# Patient Record
Sex: MALE | Race: WHITE | NOT HISPANIC OR LATINO | Employment: OTHER | URBAN - METROPOLITAN AREA
[De-identification: names, ages, dates, MRNs, and addresses within clinical notes are randomized per-mention and may not be internally consistent; named-entity substitution may affect disease eponyms.]

---

## 2017-01-24 ENCOUNTER — RECORDS - HEALTHEAST (OUTPATIENT)
Dept: LAB | Facility: CLINIC | Age: 62
End: 2017-01-24

## 2017-01-24 LAB
CHOLEST SERPL-MCNC: 129 MG/DL
FASTING STATUS PATIENT QL REPORTED: ABNORMAL
HDLC SERPL-MCNC: 39 MG/DL
LDLC SERPL CALC-MCNC: 81 MG/DL
PSA SERPL-MCNC: 4.1 NG/ML (ref 0–4.5)
TRIGL SERPL-MCNC: 45 MG/DL

## 2017-01-25 LAB — HCV AB SERPL QL IA: NEGATIVE

## 2017-03-16 ENCOUNTER — OFFICE VISIT (OUTPATIENT)
Dept: NEUROLOGY | Facility: CLINIC | Age: 62
End: 2017-03-16

## 2017-03-16 DIAGNOSIS — G56.03 BILATERAL CARPAL TUNNEL SYNDROME: Primary | ICD-10-CM

## 2017-03-16 NOTE — MR AVS SNAPSHOT
After Visit Summary   3/16/2017    Amilcar Penaloza    MRN: [id number]           Patient Information     Date Of Birth          1955        Visit Information        Provider Department      3/16/2017 11:00 AM Marcellus Arellano MD [facility]        Today's Diagnoses     Bilateral carpal tunnel syndrome    -  1       Follow-ups after your visit        Who to contact     Please call your clinic at [phone] to:    Ask questions about your health    Make or cancel appointments    Discuss your medicines    Learn about your test results    Speak to your doctor   If you have compliments or concerns about an experience at your clinic, or if you wish to file a complaint, please contact [facility] Patient Relations at [phone] or email us at [email or web address]         Additional Information About Your Visit        MyChart Information     Lumicityt is an electronic gateway that provides easy, online access to your medical records. With BuildingLayer, you can request a clinic appointment, read your test results, renew a prescription or communicate with your care team.     To sign up for Lumicityt visit the website at [email or web address]   You will be asked to enter the access code listed below, as well as some personal information. Please follow the directions to create your username and password.     Your access code is: [email or web address]  Expires: 2017  1:12 PM     Your access code will  in 90 days. If you need help or a new code, please contact your [facility] or call [phone] for assistance.        Care EveryWhere ID     This is your Care EveryWhere ID. This could be used by other organizations to access your [facility] medical records  [id number]         Blood Pressure from Last 3 Encounters:   No data found for BP    Weight from Last 3 Encounters:   No data found  for Wt              We Performed the Following     NCS Motor w or w/o F-Wave, 5 or 6 (01834)     Needle EMG - 1 Extremity  (5 or more muscles w/ 3or more nerves or 4 or more spinal levels) (66023)        Primary Care Provider Office Phone # Fax #    Esa Maciel [phone]       [facility] [address] [place] [address]        Thank you!     Thank you for choosing [facility]  for your care. Our goal is always to provide you with excellent care. Hearing back from our patients is one way we can continue to improve our services. Please take a few minutes to complete the written survey that you may receive in the mail after your visit with us. Thank you!             Your Updated Medication List - Protect others around you: Learn how to safely use, store and throw away your medicines at www.disposemymeds.org.      Notice  As of 3/16/2017 11:59 PM    You have not been prescribed any medications.

## 2017-03-16 NOTE — LETTER
3/16/2017       RE: Milla Deluna  [address] [facility] [address]     Dear Colleague,    Thank you for referring your patient, Milla Deluna, to the [facility] NEUROLOGY CLINIC at [facility]. Please see a copy of my visit note below.    RE: MILLA DELUNA : 1955   MRN: [id number] YADIRA: 2017     REFERRING:   Esa Maciel MD, [place]  [address]     INDICATIONS:   Evaluate the upper extremities for pain, paresthesias and weakness.    RIGHT AND LEFT UPPER EXTREMITY EMG EXAMINATION    RIGHT UPPER EXTREMITY      CONDUCTION   VELOCITIES STIM. LATENCY  MS AMPLITUDE   DISTANCE  CM   NCV NORMAL   Right Median Motor Nerve Above Elbow 8.8  3.8 mV      Record  / APB      27 AE-W 54 M/sec >50    F-Wave :  33.4 msec Wrist 3.8  3.8 mV             Right Ulnar Motor Nerve Above Elbow 8.8  8.1 mV     Record / ADM      36 AE-W 59 M/sec >50    F-Wave:  31.3 msec Below Elbow    mV          - BE-W - M/sec >50    Wrist 2.7  8.1 mV                 Right Sensory Median Nerve  4.0  22 micro      Antidromic Record / Digit II                  Right Sensory Ulnar  3.1  25 micro     Antidromic Record / Digit V                  Left Median Sensory Nerve  3.8  22 micro     Antidromic Record / Digit II                           NEEDLE ELECTRODE EXAMINATION (EMG)   MUSCLES   POS. POT. FIBS. FASCIC.   MOTOR UNIT ACTION POTENTIALS     Right 1st Dorsal Interosseous 0 0 0 Normal   Right Pronator Teres 0 0 0 Normal   Right Abductor Pollicis Brevis 0 0 0 Mild to moderate increased duration, mild to moderate decreased number of units activated with increased polyphasic units seen   Right Biceps 0 0 0 Normal   Left Abductor Pollicis  Brevis 0 0 0 Mild increased duration, mild decreased number of units activated     CLINICAL SUMMARY:  Nerve conduction studies of the right and left upper extremities showed prolongation of both median sensory  nerve action potential latencies at the wrist.  There was borderline amplitude of the median compound muscle action potential and prolongation of the median F Wave.  Needle examination showed chronic neurogenic changes in both abductor pollicis brevis muscles, worse on the right than the left.     CLINICAL INTERPRETATION:  The electromyographic examination shows a moderate to moderately severe right carpal tunnel syndrome.  There is a moderate one on the left.       Marcellus Arellano M.D.  Department of Neurology   [facility]    cc:  Levy Palacios MD  [facility]  [address] [place]   [address]    Esa Maciel MD  [address]  [address]      D: 2017 12:12   T: 2017 16:48   MT: AKA    Name:     MILLA DELUNA   MRN:      [id number]        Account:      [id number]   :      1955           Service Date: 2017    Document: [id number]

## 2017-03-17 NOTE — PROGRESS NOTES
RE: MILLA DELUNA : 1955   MRN: [id number] YADIRA: 2017     REFERRING:   Esa Maciel MD, [place]  [address]     INDICATIONS:   Evaluate the upper extremities for pain, paresthesias and weakness.    RIGHT AND LEFT UPPER EXTREMITY EMG EXAMINATION    RIGHT UPPER EXTREMITY      CONDUCTION   VELOCITIES STIM. LATENCY  MS AMPLITUDE   DISTANCE  CM   NCV NORMAL   Right Median Motor Nerve Above Elbow 8.8  3.8 mV      Record  / APB      27 AE-W 54 M/sec >50    F-Wave :  33.4 msec Wrist 3.8  3.8 mV             Right Ulnar Motor Nerve Above Elbow 8.8  8.1 mV     Record / ADM      36 AE-W 59 M/sec >50    F-Wave:  31.3 msec Below Elbow    mV          - BE-W - M/sec >50    Wrist 2.7  8.1 mV                 Right Sensory Median Nerve  4.0  22 micro      Antidromic Record / Digit II                  Right Sensory Ulnar  3.1  25 micro     Antidromic Record / Digit V                  Left Median Sensory Nerve  3.8  22 micro     Antidromic Record / Digit II                           NEEDLE ELECTRODE EXAMINATION (EMG)   MUSCLES   POS. POT. FIBS. FASCIC.   MOTOR UNIT ACTION POTENTIALS     Right 1st Dorsal Interosseous 0 0 0 Normal   Right Pronator Teres 0 0 0 Normal   Right Abductor Pollicis Brevis 0 0 0 Mild to moderate increased duration, mild to moderate decreased number of units activated with increased polyphasic units seen   Right Biceps 0 0 0 Normal   Left Abductor Pollicis  Brevis 0 0 0 Mild increased duration, mild decreased number of units activated     CLINICAL SUMMARY:  Nerve conduction studies of the right and left upper extremities showed prolongation of both median sensory nerve action potential latencies at the wrist.  There was borderline amplitude of the median compound muscle action potential and prolongation of the median F Wave.  Needle examination showed chronic neurogenic changes in both abductor pollicis brevis muscles, worse on the right than the left.     CLINICAL  INTERPRETATION:  The electromyographic examination shows a moderate to moderately severe right carpal tunnel syndrome.  There is a moderate one on the left.       Marcellus Arellano M.D.  Department of Neurology   [facility]    cc:  Levy Palacios MD  [facility]  [address] [place]   [address]    Esa Maciel MD  [address]  [address]      D: 2017 12:12   T: 2017 16:48   MT: AKA    Name:     MILLA DELUNA   MRN:      [id number]        Account:      [id number]   :      1955           Service Date: 2017    Document: [id number]

## 2018-02-05 ENCOUNTER — AMBULATORY - HEALTHEAST (OUTPATIENT)
Dept: CARDIOLOGY | Facility: CLINIC | Age: 63
End: 2018-02-05

## 2018-02-05 ENCOUNTER — RECORDS - HEALTHEAST (OUTPATIENT)
Dept: LAB | Facility: CLINIC | Age: 63
End: 2018-02-05

## 2018-02-05 ENCOUNTER — OFFICE VISIT - HEALTHEAST (OUTPATIENT)
Dept: CARDIOLOGY | Facility: CLINIC | Age: 63
End: 2018-02-05

## 2018-02-05 ENCOUNTER — RECORDS - HEALTHEAST (OUTPATIENT)
Dept: ADMINISTRATIVE | Facility: OTHER | Age: 63
End: 2018-02-05

## 2018-02-05 DIAGNOSIS — R00.2 PALPITATION: ICD-10-CM

## 2018-02-05 DIAGNOSIS — I48.0 PAROXYSMAL ATRIAL FIBRILLATION (H): ICD-10-CM

## 2018-02-05 LAB
ALBUMIN SERPL-MCNC: 3.8 G/DL (ref 3.5–5)
ALP SERPL-CCNC: 82 U/L (ref 45–120)
ALT SERPL W P-5'-P-CCNC: 23 U/L (ref 0–45)
ANION GAP SERPL CALCULATED.3IONS-SCNC: 10 MMOL/L (ref 5–18)
AST SERPL W P-5'-P-CCNC: 23 U/L (ref 0–40)
ATRIAL RATE - MUSE: 71 BPM
BILIRUB SERPL-MCNC: 0.6 MG/DL (ref 0–1)
BUN SERPL-MCNC: 27 MG/DL (ref 8–22)
CALCIUM SERPL-MCNC: 9.6 MG/DL (ref 8.5–10.5)
CHLORIDE BLD-SCNC: 104 MMOL/L (ref 98–107)
CO2 SERPL-SCNC: 25 MMOL/L (ref 22–31)
CREAT SERPL-MCNC: 1.04 MG/DL (ref 0.7–1.3)
DIASTOLIC BLOOD PRESSURE - MUSE: NORMAL MMHG
GFR SERPL CREATININE-BSD FRML MDRD: >60 ML/MIN/1.73M2
GLUCOSE BLD-MCNC: 83 MG/DL (ref 70–125)
INTERPRETATION ECG - MUSE: NORMAL
P AXIS - MUSE: 66 DEGREES
POTASSIUM BLD-SCNC: 4.7 MMOL/L (ref 3.5–5)
PR INTERVAL - MUSE: 164 MS
PROT SERPL-MCNC: 7.1 G/DL (ref 6–8)
PSA SERPL-MCNC: 4.8 NG/ML (ref 0–4.5)
QRS DURATION - MUSE: 90 MS
QT - MUSE: 366 MS
QTC - MUSE: 397 MS
R AXIS - MUSE: 49 DEGREES
SODIUM SERPL-SCNC: 139 MMOL/L (ref 136–145)
SYSTOLIC BLOOD PRESSURE - MUSE: NORMAL MMHG
T AXIS - MUSE: 33 DEGREES
TSH SERPL DL<=0.005 MIU/L-ACNC: 2.18 UIU/ML (ref 0.3–5)
VENTRICULAR RATE- MUSE: 71 BPM

## 2018-02-05 RX ORDER — MERCAPTOPURINE 50 MG/1
50 TABLET ORAL DAILY
Status: SHIPPED | COMMUNITY
Start: 2018-02-05

## 2018-02-05 ASSESSMENT — MIFFLIN-ST. JEOR: SCORE: 1577.32

## 2018-02-09 ENCOUNTER — HOSPITAL ENCOUNTER (OUTPATIENT)
Dept: CARDIOLOGY | Facility: HOSPITAL | Age: 63
Discharge: HOME | End: 2018-02-09
Attending: INTERNAL MEDICINE

## 2018-02-09 ENCOUNTER — COMMUNICATION - HEALTHEAST (OUTPATIENT)
Dept: TELEHEALTH | Facility: CLINIC | Age: 63
End: 2018-02-09

## 2018-02-09 DIAGNOSIS — R00.2 PALPITATION: ICD-10-CM

## 2018-02-09 LAB
AORTIC ROOT: 3.8 CM
AORTIC VALVE MEAN VELOCITY: 73.7 CM/S
ASCENDING AORTA: 3.3 CM
AV CUSP SEPERATION: 2.3 CM
AV CUSP SEPERATION: 2.3 CM
AV DIMENSIONLESS INDEX VTI: 0.6
AV MEAN GRADIENT: 2 MMHG
AV PEAK GRADIENT: 4.4 MMHG
AV VALVE AREA: 2.6 CM2
BSA FOR ECHO PROCEDURE: 2 M2
CV ECHO HEIGHT: 67 IN
CV ECHO WEIGHT: 186 LBS
DOP CALC AO PEAK VEL: 105 CM/S
DOP CALC AO VTI: 19 CM
DOP CALC LVOT AREA: 4.15 CM2
DOP CALC LVOT DIAMETER: 2.3 CM
DOP CALC LVOT STROKE VOLUME: 49 CM3
DOP CALC MV VTI: 14.3 CM
DOP CALCLVOT PEAK VEL VTI: 11.8 CM
EJECTION FRACTION: 58 % (ref 55–75)
FRACTIONAL SHORTENING: 31.7 % (ref 28–44)
INTERVENTRICULAR SEPTUM IN END DIASTOLE: 1.3 CM (ref 0.6–1)
IVS/PW RATIO: 1
LA AREA 1: 14 CM2
LA AREA 2: 14.3 CM2
LEFT ATRIUM LENGTH: 4.72 CM
LEFT ATRIUM SIZE: 3.7 CM
LEFT ATRIUM TO AORTIC ROOT RATIO: 1 NO UNITS
LEFT ATRIUM VOLUME INDEX: 18 ML/M2
LEFT ATRIUM VOLUME: 36.1 ML
LEFT VENTRICLE CARDIAC INDEX: 1.8 L/MIN/M2
LEFT VENTRICLE CARDIAC OUTPUT: 3.7 L/MIN
LEFT VENTRICLE DIASTOLIC VOLUME INDEX: 18 CM3/M2 (ref 34–74)
LEFT VENTRICLE DIASTOLIC VOLUME: 36 CM3 (ref 62–150)
LEFT VENTRICLE HEART RATE: 75 BPM
LEFT VENTRICLE MASS INDEX: 96.7 G/M2
LEFT VENTRICLE SYSTOLIC VOLUME INDEX: 7.5 CM3/M2 (ref 11–31)
LEFT VENTRICLE SYSTOLIC VOLUME: 15 CM3 (ref 21–61)
LEFT VENTRICULAR INTERNAL DIMENSION IN DIASTOLE: 4.1 CM (ref 4.2–5.8)
LEFT VENTRICULAR INTERNAL DIMENSION IN SYSTOLE: 2.8 CM (ref 2.5–4)
LEFT VENTRICULAR MASS: 193.5 G
LEFT VENTRICULAR OUTFLOW TRACT MEAN GRADIENT: 1 MMHG
LEFT VENTRICULAR OUTFLOW TRACT MEAN VELOCITY: 47.9 CM/S
LEFT VENTRICULAR POSTERIOR WALL IN END DIASTOLE: 1.3 CM (ref 0.6–1)
LV STROKE VOLUME INDEX: 24.5 ML/M2
MITRAL VALVE DECELERATION SLOPE: 2530 MM/S2
MITRAL VALVE E/A RATIO: 0.7
MITRAL VALVE MEAN INFLOW VELOCITY: 34.9 CM/S
MITRAL VALVE PEAK VELOCITY: 78.6 CM/S
MITRAL VALVE PRESSURE HALF-TIME: 50 MS
MV AREA VTI: 3.43 CM2
MV AVERAGE E/E' RATIO: 6.8 CM/S
MV DECELERATION TIME: 197 MS
MV E'TISSUE VEL-LAT: 6.92 CM/S
MV E'TISSUE VEL-MED: 5.07 CM/S
MV LATERAL E/E' RATIO: 5.9
MV MEAN GRADIENT: 1 MMHG
MV MEDIAL E/E' RATIO: 8
MV PEAK A VELOCITY: 62.2 CM/S
MV PEAK E VELOCITY: 40.5 CM/S
MV PEAK GRADIENT: 2.5 MMHG
MV VALVE AREA BY CONTINUITY EQUATION: 3.4 CM2
MV VALVE AREA PRESSURE 1/2 METHOD: 4.4 CM2
NUC REST DIASTOLIC VOLUME INDEX: 2976 LBS
NUC REST SYSTOLIC VOLUME INDEX: 67 IN
TRICUSPID REGURGITATION PEAK PRESSURE GRADIENT: 22.1 MMHG
TRICUSPID VALVE ANULAR PLANE SYSTOLIC EXCURSION: 1.9 CM
TRICUSPID VALVE PEAK REGURGITANT VELOCITY: 235 CM/S

## 2018-02-09 ASSESSMENT — MIFFLIN-ST. JEOR: SCORE: 1577.32

## 2018-02-12 ENCOUNTER — COMMUNICATION - HEALTHEAST (OUTPATIENT)
Dept: CARDIOLOGY | Facility: CLINIC | Age: 63
End: 2018-02-12

## 2018-02-12 DIAGNOSIS — I48.0 PAROXYSMAL ATRIAL FIBRILLATION (H): ICD-10-CM

## 2018-02-13 ENCOUNTER — RECORDS - HEALTHEAST (OUTPATIENT)
Dept: LAB | Facility: CLINIC | Age: 63
End: 2018-02-13

## 2018-02-13 LAB
CHOLEST SERPL-MCNC: 136 MG/DL
FASTING STATUS PATIENT QL REPORTED: NORMAL
HDLC SERPL-MCNC: 40 MG/DL
LDLC SERPL CALC-MCNC: 86 MG/DL
TRIGL SERPL-MCNC: 52 MG/DL

## 2018-02-21 ENCOUNTER — COMMUNICATION - HEALTHEAST (OUTPATIENT)
Dept: CARDIOLOGY | Facility: CLINIC | Age: 63
End: 2018-02-21

## 2018-03-29 ENCOUNTER — OFFICE VISIT - HEALTHEAST (OUTPATIENT)
Dept: CARDIOLOGY | Facility: CLINIC | Age: 63
End: 2018-03-29

## 2018-03-29 DIAGNOSIS — I48.0 PAF (PAROXYSMAL ATRIAL FIBRILLATION) (H): ICD-10-CM

## 2018-03-29 DIAGNOSIS — I48.0 PAROXYSMAL ATRIAL FIBRILLATION (H): ICD-10-CM

## 2018-03-29 DIAGNOSIS — G47.33 OSA (OBSTRUCTIVE SLEEP APNEA): ICD-10-CM

## 2018-03-29 DIAGNOSIS — I48.92 ATRIAL FLUTTER, UNSPECIFIED TYPE (H): ICD-10-CM

## 2018-03-29 ASSESSMENT — MIFFLIN-ST. JEOR: SCORE: 1584.8

## 2018-06-19 ENCOUNTER — OFFICE VISIT - HEALTHEAST (OUTPATIENT)
Dept: CARDIOLOGY | Facility: CLINIC | Age: 63
End: 2018-06-19

## 2018-06-19 ENCOUNTER — RECORDS - HEALTHEAST (OUTPATIENT)
Dept: ADMINISTRATIVE | Facility: OTHER | Age: 63
End: 2018-06-19

## 2018-06-19 ENCOUNTER — AMBULATORY - HEALTHEAST (OUTPATIENT)
Dept: CARDIOLOGY | Facility: CLINIC | Age: 63
End: 2018-06-19

## 2018-06-19 DIAGNOSIS — I10 ESSENTIAL HYPERTENSION, BENIGN: ICD-10-CM

## 2018-06-19 DIAGNOSIS — I48.92 ATRIAL FLUTTER, UNSPECIFIED TYPE (H): ICD-10-CM

## 2018-06-19 DIAGNOSIS — G47.33 OSA (OBSTRUCTIVE SLEEP APNEA): ICD-10-CM

## 2018-06-19 DIAGNOSIS — I48.0 PAF (PAROXYSMAL ATRIAL FIBRILLATION) (H): ICD-10-CM

## 2018-06-19 ASSESSMENT — MIFFLIN-ST. JEOR: SCORE: 1557.47

## 2018-06-25 ENCOUNTER — COMMUNICATION - HEALTHEAST (OUTPATIENT)
Dept: CARDIOLOGY | Facility: CLINIC | Age: 63
End: 2018-06-25

## 2018-07-24 ENCOUNTER — OFFICE VISIT - HEALTHEAST (OUTPATIENT)
Dept: CARDIOLOGY | Facility: CLINIC | Age: 63
End: 2018-07-24

## 2018-07-24 DIAGNOSIS — I48.3 TYPICAL ATRIAL FLUTTER (H): ICD-10-CM

## 2018-07-24 DIAGNOSIS — I10 ESSENTIAL HYPERTENSION, BENIGN: ICD-10-CM

## 2018-07-24 DIAGNOSIS — G47.33 OSA (OBSTRUCTIVE SLEEP APNEA): ICD-10-CM

## 2018-07-24 DIAGNOSIS — I48.0 PAF (PAROXYSMAL ATRIAL FIBRILLATION) (H): ICD-10-CM

## 2018-07-24 ASSESSMENT — MIFFLIN-ST. JEOR: SCORE: 1543.86

## 2018-07-25 ENCOUNTER — COMMUNICATION - HEALTHEAST (OUTPATIENT)
Dept: CARDIOLOGY | Facility: CLINIC | Age: 63
End: 2018-07-25

## 2018-08-03 ENCOUNTER — COMMUNICATION - HEALTHEAST (OUTPATIENT)
Dept: CARDIOLOGY | Facility: CLINIC | Age: 63
End: 2018-08-03

## 2018-08-03 ENCOUNTER — HOSPITAL ENCOUNTER (OUTPATIENT)
Dept: CARDIOLOGY | Facility: CLINIC | Age: 63
Discharge: HOME | End: 2018-08-03
Attending: INTERNAL MEDICINE

## 2018-08-03 ENCOUNTER — AMBULATORY - HEALTHEAST (OUTPATIENT)
Dept: CARDIOLOGY | Facility: CLINIC | Age: 63
End: 2018-08-03

## 2018-08-03 DIAGNOSIS — I48.0 PAROXYSMAL ATRIAL FIBRILLATION (H): ICD-10-CM

## 2018-08-03 DIAGNOSIS — I48.3 TYPICAL ATRIAL FLUTTER (H): ICD-10-CM

## 2018-08-03 DIAGNOSIS — I10 ESSENTIAL HYPERTENSION, BENIGN: ICD-10-CM

## 2018-08-03 DIAGNOSIS — I48.0 PAF (PAROXYSMAL ATRIAL FIBRILLATION) (H): ICD-10-CM

## 2018-08-03 DIAGNOSIS — I10 BENIGN ESSENTIAL HYPERTENSION: ICD-10-CM

## 2018-08-03 ASSESSMENT — MIFFLIN-ST. JEOR: SCORE: 1543.86

## 2018-08-06 LAB
AORTIC ROOT: 3.1 CM
BSA FOR ECHO PROCEDURE: 1.95 M2
CV BLOOD PRESSURE: NORMAL MMHG
CV ECHO HEIGHT: 67.8 IN
CV ECHO WEIGHT: 176 LBS
DOP CALC LVOT AREA: 4.15 CM2
DOP CALC LVOT DIAMETER: 2.3 CM
DOP CALC LVOT PEAK VEL: 82.7 CM/S
DOP CALC LVOT STROKE VOLUME: 73.9 CM3
DOP CALCLVOT PEAK VEL VTI: 17.8 CM
EJECTION FRACTION: 61 % (ref 55–75)
FRACTIONAL SHORTENING: 46.8 % (ref 28–44)
INTERVENTRICULAR SEPTUM IN END DIASTOLE: 1.1 CM (ref 0.6–1)
IVS/PW RATIO: 1
LA AREA 1: 15 CM2
LA AREA 2: 17.5 CM2
LEFT ATRIUM LENGTH: 5.1 CM
LEFT ATRIUM SIZE: 3.4 CM
LEFT ATRIUM TO AORTIC ROOT RATIO: 1.1 NO UNITS
LEFT ATRIUM VOLUME INDEX: 22.4 ML/M2
LEFT ATRIUM VOLUME: 43.8 ML
LEFT VENTRICLE CARDIAC INDEX: 2.7 L/MIN/M2
LEFT VENTRICLE CARDIAC OUTPUT: 5.2 L/MIN
LEFT VENTRICLE DIASTOLIC VOLUME INDEX: 33.8 CM3/M2 (ref 34–74)
LEFT VENTRICLE DIASTOLIC VOLUME: 66 CM3 (ref 62–150)
LEFT VENTRICLE HEART RATE: 70 BPM
LEFT VENTRICLE MASS INDEX: 96.2 G/M2
LEFT VENTRICLE SYSTOLIC VOLUME INDEX: 13.3 CM3/M2 (ref 11–31)
LEFT VENTRICLE SYSTOLIC VOLUME: 26 CM3 (ref 21–61)
LEFT VENTRICULAR INTERNAL DIMENSION IN DIASTOLE: 4.7 CM (ref 4.2–5.8)
LEFT VENTRICULAR INTERNAL DIMENSION IN SYSTOLE: 2.5 CM (ref 2.5–4)
LEFT VENTRICULAR MASS: 187.5 G
LEFT VENTRICULAR OUTFLOW TRACT MEAN GRADIENT: 1 MMHG
LEFT VENTRICULAR OUTFLOW TRACT MEAN VELOCITY: 51.2 CM/S
LEFT VENTRICULAR OUTFLOW TRACT PEAK GRADIENT: 3 MMHG
LEFT VENTRICULAR POSTERIOR WALL IN END DIASTOLE: 1.1 CM (ref 0.6–1)
LV STROKE VOLUME INDEX: 37.9 ML/M2
MITRAL VALVE E/A RATIO: 0.9
MV AVERAGE E/E' RATIO: 6.4 CM/S
MV DECELERATION TIME: 222 MS
MV E'TISSUE VEL-LAT: 9.07 CM/S
MV E'TISSUE VEL-MED: 6.43 CM/S
MV LATERAL E/E' RATIO: 5.4
MV MEDIAL E/E' RATIO: 7.7
MV PEAK A VELOCITY: 52.3 CM/S
MV PEAK E VELOCITY: 49.4 CM/S
NUC REST DIASTOLIC VOLUME INDEX: 2816 LBS
NUC REST SYSTOLIC VOLUME INDEX: 67.75 IN
RIGHT VENTRICULAR INTERNAL DIMENSION IN DYSTOLE: 3.1 CM
TRICUSPID REGURGITATION PEAK PRESSURE GRADIENT: 22.8 MMHG
TRICUSPID VALVE ANULAR PLANE SYSTOLIC EXCURSION: 2.5 CM
TRICUSPID VALVE PEAK REGURGITANT VELOCITY: 239 CM/S

## 2018-09-10 ENCOUNTER — RECORDS - HEALTHEAST (OUTPATIENT)
Dept: ADMINISTRATIVE | Facility: OTHER | Age: 63
End: 2018-09-10

## 2018-09-17 ENCOUNTER — OFFICE VISIT - HEALTHEAST (OUTPATIENT)
Dept: CARDIOLOGY | Facility: CLINIC | Age: 63
End: 2018-09-17

## 2018-09-17 DIAGNOSIS — G47.33 OSA (OBSTRUCTIVE SLEEP APNEA): ICD-10-CM

## 2018-09-17 DIAGNOSIS — I48.0 PAF (PAROXYSMAL ATRIAL FIBRILLATION) (H): ICD-10-CM

## 2018-09-17 DIAGNOSIS — I10 ESSENTIAL HYPERTENSION, BENIGN: ICD-10-CM

## 2018-09-17 DIAGNOSIS — I48.3 TYPICAL ATRIAL FLUTTER (H): ICD-10-CM

## 2018-09-17 ASSESSMENT — MIFFLIN-ST. JEOR: SCORE: 1557.47

## 2018-09-20 ENCOUNTER — AMBULATORY - HEALTHEAST (OUTPATIENT)
Dept: CARDIOLOGY | Facility: CLINIC | Age: 63
End: 2018-09-20

## 2018-09-20 ENCOUNTER — SURGERY - HEALTHEAST (OUTPATIENT)
Dept: CARDIOLOGY | Facility: CLINIC | Age: 63
End: 2018-09-20

## 2018-09-20 DIAGNOSIS — Z01.818 PRE-OP TESTING: ICD-10-CM

## 2018-09-20 DIAGNOSIS — I48.3 TYPICAL ATRIAL FLUTTER (H): ICD-10-CM

## 2018-09-20 DIAGNOSIS — I48.91 ATRIAL FIBRILLATION (H): ICD-10-CM

## 2018-10-25 ENCOUNTER — SURGERY - HEALTHEAST (OUTPATIENT)
Dept: CARDIOLOGY | Facility: CLINIC | Age: 63
End: 2018-10-25

## 2018-10-25 DIAGNOSIS — I48.0 PAROXYSMAL ATRIAL FIBRILLATION (H): ICD-10-CM

## 2018-10-30 ENCOUNTER — COMMUNICATION - HEALTHEAST (OUTPATIENT)
Dept: CARDIOLOGY | Facility: CLINIC | Age: 63
End: 2018-10-30

## 2018-10-30 DIAGNOSIS — I48.0 PAROXYSMAL ATRIAL FIBRILLATION (H): ICD-10-CM

## 2018-10-31 ENCOUNTER — RECORDS - HEALTHEAST (OUTPATIENT)
Dept: ADMINISTRATIVE | Facility: OTHER | Age: 63
End: 2018-10-31

## 2018-11-05 ENCOUNTER — COMMUNICATION - HEALTHEAST (OUTPATIENT)
Dept: CARDIOLOGY | Facility: CLINIC | Age: 63
End: 2018-11-05

## 2018-11-05 DIAGNOSIS — I10 BENIGN ESSENTIAL HYPERTENSION: ICD-10-CM

## 2018-11-05 DIAGNOSIS — I48.0 PAROXYSMAL ATRIAL FIBRILLATION (H): ICD-10-CM

## 2018-11-29 ENCOUNTER — RECORDS - HEALTHEAST (OUTPATIENT)
Dept: ADMINISTRATIVE | Facility: OTHER | Age: 63
End: 2018-11-29

## 2018-12-04 ENCOUNTER — AMBULATORY - HEALTHEAST (OUTPATIENT)
Dept: CARDIOLOGY | Facility: CLINIC | Age: 63
End: 2018-12-04

## 2018-12-04 ENCOUNTER — OFFICE VISIT - HEALTHEAST (OUTPATIENT)
Dept: CARDIOLOGY | Facility: CLINIC | Age: 63
End: 2018-12-04

## 2018-12-04 DIAGNOSIS — I10 ESSENTIAL HYPERTENSION, BENIGN: ICD-10-CM

## 2018-12-04 DIAGNOSIS — I48.3 TYPICAL ATRIAL FLUTTER (H): ICD-10-CM

## 2018-12-04 DIAGNOSIS — I48.0 PAF (PAROXYSMAL ATRIAL FIBRILLATION) (H): ICD-10-CM

## 2018-12-04 DIAGNOSIS — G47.33 OSA (OBSTRUCTIVE SLEEP APNEA): ICD-10-CM

## 2018-12-04 ASSESSMENT — MIFFLIN-ST. JEOR: SCORE: 1575.61

## 2018-12-20 ENCOUNTER — HOSPITAL ENCOUNTER (OUTPATIENT)
Dept: CT IMAGING | Facility: CLINIC | Age: 63
Discharge: HOME | End: 2018-12-20
Attending: INTERNAL MEDICINE

## 2018-12-20 DIAGNOSIS — I48.0 PAF (PAROXYSMAL ATRIAL FIBRILLATION) (H): ICD-10-CM

## 2018-12-20 LAB
BSA FOR ECHO PROCEDURE: 1.99 M2
CREAT BLD-MCNC: 1.3 MG/DL
POC GFR AMER AF HE - HISTORICAL: >60 ML/MIN/1.73M2
POC GFR NON AMER AF HE - HISTORICAL: 55 ML/MIN/1.73M2

## 2018-12-20 ASSESSMENT — MIFFLIN-ST. JEOR: SCORE: 1575.61

## 2019-01-07 ENCOUNTER — AMBULATORY - HEALTHEAST (OUTPATIENT)
Dept: CARDIOLOGY | Facility: CLINIC | Age: 64
End: 2019-01-07

## 2019-01-17 ENCOUNTER — OFFICE VISIT - HEALTHEAST (OUTPATIENT)
Dept: CARDIOLOGY | Facility: CLINIC | Age: 64
End: 2019-01-17

## 2019-01-17 ENCOUNTER — SURGERY - HEALTHEAST (OUTPATIENT)
Dept: CARDIOLOGY | Facility: CLINIC | Age: 64
End: 2019-01-17

## 2019-01-17 ENCOUNTER — AMBULATORY - HEALTHEAST (OUTPATIENT)
Dept: CARDIOLOGY | Facility: CLINIC | Age: 64
End: 2019-01-17

## 2019-01-17 DIAGNOSIS — I10 ESSENTIAL HYPERTENSION, BENIGN: ICD-10-CM

## 2019-01-17 DIAGNOSIS — I48.0 PAROXYSMAL ATRIAL FIBRILLATION (H): ICD-10-CM

## 2019-01-17 DIAGNOSIS — G47.33 OSA (OBSTRUCTIVE SLEEP APNEA): ICD-10-CM

## 2019-01-17 DIAGNOSIS — I48.3 TYPICAL ATRIAL FLUTTER (H): ICD-10-CM

## 2019-01-17 DIAGNOSIS — I48.0 PAF (PAROXYSMAL ATRIAL FIBRILLATION) (H): ICD-10-CM

## 2019-01-17 LAB
ANION GAP SERPL CALCULATED.3IONS-SCNC: 5 MMOL/L (ref 5–18)
ATRIAL RATE - MUSE: 68 BPM
BUN SERPL-MCNC: 21 MG/DL (ref 8–22)
CALCIUM SERPL-MCNC: 9.7 MG/DL (ref 8.5–10.5)
CHLORIDE BLD-SCNC: 102 MMOL/L (ref 98–107)
CO2 SERPL-SCNC: 30 MMOL/L (ref 22–31)
CREAT SERPL-MCNC: 1.13 MG/DL (ref 0.7–1.3)
DIASTOLIC BLOOD PRESSURE - MUSE: NORMAL MMHG
ERYTHROCYTE [DISTWIDTH] IN BLOOD BY AUTOMATED COUNT: 12.5 % (ref 11–14.5)
GFR SERPL CREATININE-BSD FRML MDRD: >60 ML/MIN/1.73M2
GLUCOSE BLD-MCNC: 89 MG/DL (ref 70–125)
HCT VFR BLD AUTO: 47 % (ref 40–54)
HGB BLD-MCNC: 16 G/DL (ref 14–18)
INTERPRETATION ECG - MUSE: NORMAL
MCH RBC QN AUTO: 31.9 PG (ref 27–34)
MCHC RBC AUTO-ENTMCNC: 34 G/DL (ref 32–36)
MCV RBC AUTO: 94 FL (ref 80–100)
P AXIS - MUSE: 57 DEGREES
PLATELET # BLD AUTO: 205 THOU/UL (ref 140–440)
PMV BLD AUTO: 9.6 FL (ref 8.5–12.5)
POTASSIUM BLD-SCNC: 4.5 MMOL/L (ref 3.5–5)
PR INTERVAL - MUSE: 166 MS
QRS DURATION - MUSE: 92 MS
QT - MUSE: 384 MS
QTC - MUSE: 408 MS
R AXIS - MUSE: 36 DEGREES
RBC # BLD AUTO: 5.01 MILL/UL (ref 4.4–6.2)
SODIUM SERPL-SCNC: 137 MMOL/L (ref 136–145)
SYSTOLIC BLOOD PRESSURE - MUSE: NORMAL MMHG
T AXIS - MUSE: 29 DEGREES
VENTRICULAR RATE- MUSE: 68 BPM
WBC: 6.8 THOU/UL (ref 4–11)

## 2019-01-17 ASSESSMENT — MIFFLIN-ST. JEOR: SCORE: 1586.5

## 2019-01-22 ENCOUNTER — ANESTHESIA - HEALTHEAST (OUTPATIENT)
Dept: CARDIOLOGY | Facility: CLINIC | Age: 64
End: 2019-01-22

## 2019-01-23 ENCOUNTER — SURGERY - HEALTHEAST (OUTPATIENT)
Dept: CARDIOLOGY | Facility: CLINIC | Age: 64
End: 2019-01-23

## 2019-01-23 ASSESSMENT — MIFFLIN-ST. JEOR
SCORE: 1590.93
SCORE: 1603.63

## 2019-01-24 ASSESSMENT — MIFFLIN-ST. JEOR: SCORE: 1564.17

## 2019-01-28 ENCOUNTER — AMBULATORY - HEALTHEAST (OUTPATIENT)
Dept: CARDIOLOGY | Facility: CLINIC | Age: 64
End: 2019-01-28

## 2019-01-28 DIAGNOSIS — I48.0 PAROXYSMAL ATRIAL FIBRILLATION (H): ICD-10-CM

## 2019-01-28 ASSESSMENT — MIFFLIN-ST. JEOR: SCORE: 1571.42

## 2019-01-30 ENCOUNTER — RECORDS - HEALTHEAST (OUTPATIENT)
Dept: CARDIOLOGY | Facility: CLINIC | Age: 64
End: 2019-01-30

## 2019-03-06 ENCOUNTER — OFFICE VISIT - HEALTHEAST (OUTPATIENT)
Dept: CARDIOLOGY | Facility: CLINIC | Age: 64
End: 2019-03-06

## 2019-03-06 DIAGNOSIS — G47.33 OSA (OBSTRUCTIVE SLEEP APNEA): ICD-10-CM

## 2019-03-06 DIAGNOSIS — I10 ESSENTIAL HYPERTENSION, BENIGN: ICD-10-CM

## 2019-03-06 DIAGNOSIS — I48.3 TYPICAL ATRIAL FLUTTER (H): ICD-10-CM

## 2019-03-06 DIAGNOSIS — I48.0 PAROXYSMAL ATRIAL FIBRILLATION (H): ICD-10-CM

## 2019-03-06 ASSESSMENT — MIFFLIN-ST. JEOR: SCORE: 1572.78

## 2019-03-07 ENCOUNTER — HOSPITAL ENCOUNTER (OUTPATIENT)
Dept: CARDIOLOGY | Facility: HOSPITAL | Age: 64
Discharge: HOME | End: 2019-03-07
Attending: NURSE PRACTITIONER

## 2019-03-07 DIAGNOSIS — I48.3 TYPICAL ATRIAL FLUTTER (H): ICD-10-CM

## 2019-03-07 DIAGNOSIS — I48.0 PAROXYSMAL ATRIAL FIBRILLATION (H): ICD-10-CM

## 2019-04-10 ENCOUNTER — OFFICE VISIT - HEALTHEAST (OUTPATIENT)
Dept: CARDIOLOGY | Facility: CLINIC | Age: 64
End: 2019-04-10

## 2019-04-10 DIAGNOSIS — I10 ESSENTIAL HYPERTENSION, BENIGN: ICD-10-CM

## 2019-04-10 DIAGNOSIS — Z98.890 STATUS POST CATHETER ABLATION OF ATRIAL FIBRILLATION: ICD-10-CM

## 2019-04-10 DIAGNOSIS — I48.0 PAROXYSMAL ATRIAL FIBRILLATION (H): ICD-10-CM

## 2019-04-10 DIAGNOSIS — I10 BENIGN ESSENTIAL HYPERTENSION: ICD-10-CM

## 2019-04-10 DIAGNOSIS — G47.33 OSA (OBSTRUCTIVE SLEEP APNEA): ICD-10-CM

## 2019-04-10 DIAGNOSIS — I48.3 TYPICAL ATRIAL FLUTTER (H): ICD-10-CM

## 2019-04-10 ASSESSMENT — MIFFLIN-ST. JEOR: SCORE: 1554.64

## 2019-05-02 ENCOUNTER — COMMUNICATION - HEALTHEAST (OUTPATIENT)
Dept: CARDIOLOGY | Facility: CLINIC | Age: 64
End: 2019-05-02

## 2019-05-02 DIAGNOSIS — I48.91 ATRIAL FIBRILLATION (H): ICD-10-CM

## 2019-06-06 ENCOUNTER — RECORDS - HEALTHEAST (OUTPATIENT)
Dept: LAB | Facility: CLINIC | Age: 64
End: 2019-06-06

## 2019-06-06 LAB
ALBUMIN SERPL-MCNC: 3.9 G/DL (ref 3.5–5)
ALP SERPL-CCNC: 98 U/L (ref 45–120)
ALT SERPL W P-5'-P-CCNC: 24 U/L (ref 0–45)
ANION GAP SERPL CALCULATED.3IONS-SCNC: 6 MMOL/L (ref 5–18)
AST SERPL W P-5'-P-CCNC: 28 U/L (ref 0–40)
BASOPHILS # BLD AUTO: 0 THOU/UL (ref 0–0.2)
BASOPHILS NFR BLD AUTO: 1 % (ref 0–2)
BILIRUB SERPL-MCNC: 0.7 MG/DL (ref 0–1)
BUN SERPL-MCNC: 26 MG/DL (ref 8–22)
CALCIUM SERPL-MCNC: 9.4 MG/DL (ref 8.5–10.5)
CHLORIDE BLD-SCNC: 103 MMOL/L (ref 98–107)
CHOLEST SERPL-MCNC: 125 MG/DL
CO2 SERPL-SCNC: 29 MMOL/L (ref 22–31)
CREAT SERPL-MCNC: 1 MG/DL (ref 0.7–1.3)
EOSINOPHIL # BLD AUTO: 0.1 THOU/UL (ref 0–0.4)
EOSINOPHIL NFR BLD AUTO: 1 % (ref 0–6)
ERYTHROCYTE [DISTWIDTH] IN BLOOD BY AUTOMATED COUNT: 13.4 % (ref 11–14.5)
FASTING STATUS PATIENT QL REPORTED: NO
GFR SERPL CREATININE-BSD FRML MDRD: >60 ML/MIN/1.73M2
GLUCOSE BLD-MCNC: 87 MG/DL (ref 70–125)
HCT VFR BLD AUTO: 42.2 % (ref 40–54)
HDLC SERPL-MCNC: 39 MG/DL
HGB BLD-MCNC: 14.1 G/DL (ref 14–18)
LDLC SERPL CALC-MCNC: 79 MG/DL
LYMPHOCYTES # BLD AUTO: 1 THOU/UL (ref 0.8–4.4)
LYMPHOCYTES NFR BLD AUTO: 16 % (ref 20–40)
MCH RBC QN AUTO: 31.7 PG (ref 27–34)
MCHC RBC AUTO-ENTMCNC: 33.4 G/DL (ref 32–36)
MCV RBC AUTO: 95 FL (ref 80–100)
MONOCYTES # BLD AUTO: 0.7 THOU/UL (ref 0–0.9)
MONOCYTES NFR BLD AUTO: 11 % (ref 2–10)
NEUTROPHILS # BLD AUTO: 4.7 THOU/UL (ref 2–7.7)
NEUTROPHILS NFR BLD AUTO: 72 % (ref 50–70)
PLATELET # BLD AUTO: 246 THOU/UL (ref 140–440)
PMV BLD AUTO: 9.6 FL (ref 8.5–12.5)
POTASSIUM BLD-SCNC: 4.3 MMOL/L (ref 3.5–5)
PROT SERPL-MCNC: 6.9 G/DL (ref 6–8)
RBC # BLD AUTO: 4.45 MILL/UL (ref 4.4–6.2)
SODIUM SERPL-SCNC: 138 MMOL/L (ref 136–145)
TRIGL SERPL-MCNC: 34 MG/DL
WBC: 6.6 THOU/UL (ref 4–11)

## 2019-06-10 ENCOUNTER — AMBULATORY - HEALTHEAST (OUTPATIENT)
Dept: CARDIOLOGY | Facility: CLINIC | Age: 64
End: 2019-06-10

## 2019-07-26 ENCOUNTER — COMMUNICATION - HEALTHEAST (OUTPATIENT)
Dept: CARDIOLOGY | Facility: CLINIC | Age: 64
End: 2019-07-26

## 2019-07-26 DIAGNOSIS — I48.0 PAROXYSMAL ATRIAL FIBRILLATION (H): ICD-10-CM

## 2019-08-02 ENCOUNTER — COMMUNICATION - HEALTHEAST (OUTPATIENT)
Dept: CARDIOLOGY | Facility: CLINIC | Age: 64
End: 2019-08-02

## 2019-08-02 DIAGNOSIS — I10 BENIGN ESSENTIAL HYPERTENSION: ICD-10-CM

## 2019-09-18 ENCOUNTER — RECORDS - HEALTHEAST (OUTPATIENT)
Dept: ADMINISTRATIVE | Facility: OTHER | Age: 64
End: 2019-09-18

## 2019-09-24 ENCOUNTER — OFFICE VISIT - HEALTHEAST (OUTPATIENT)
Dept: CARDIOLOGY | Facility: CLINIC | Age: 64
End: 2019-09-24

## 2019-09-24 DIAGNOSIS — I48.0 PAROXYSMAL ATRIAL FIBRILLATION (H): ICD-10-CM

## 2019-09-24 DIAGNOSIS — I10 ESSENTIAL HYPERTENSION, BENIGN: ICD-10-CM

## 2019-09-24 DIAGNOSIS — G47.33 OSA (OBSTRUCTIVE SLEEP APNEA): ICD-10-CM

## 2019-09-24 DIAGNOSIS — I48.3 TYPICAL ATRIAL FLUTTER (H): ICD-10-CM

## 2019-09-24 ASSESSMENT — MIFFLIN-ST. JEOR: SCORE: 1572.78

## 2019-10-03 ENCOUNTER — COMMUNICATION - HEALTHEAST (OUTPATIENT)
Dept: CARDIOLOGY | Facility: CLINIC | Age: 64
End: 2019-10-03

## 2019-10-07 ENCOUNTER — AMBULATORY - HEALTHEAST (OUTPATIENT)
Dept: CARDIOLOGY | Facility: CLINIC | Age: 64
End: 2019-10-07

## 2019-10-07 DIAGNOSIS — I48.20 CHRONIC ATRIAL FIBRILLATION (H): ICD-10-CM

## 2019-10-07 LAB
ATRIAL RATE - MUSE: 83 BPM
DIASTOLIC BLOOD PRESSURE - MUSE: NORMAL
INTERPRETATION ECG - MUSE: NORMAL
P AXIS - MUSE: 69 DEGREES
PR INTERVAL - MUSE: 164 MS
QRS DURATION - MUSE: 86 MS
QT - MUSE: 348 MS
QTC - MUSE: 408 MS
R AXIS - MUSE: 50 DEGREES
SYSTOLIC BLOOD PRESSURE - MUSE: NORMAL
T AXIS - MUSE: 40 DEGREES
VENTRICULAR RATE- MUSE: 83 BPM

## 2019-10-07 ASSESSMENT — MIFFLIN-ST. JEOR: SCORE: 1582.44

## 2019-10-10 ENCOUNTER — AMBULATORY - HEALTHEAST (OUTPATIENT)
Dept: CARDIOLOGY | Facility: CLINIC | Age: 64
End: 2019-10-10

## 2020-03-26 ENCOUNTER — RECORDS - HEALTHEAST (OUTPATIENT)
Dept: ADMINISTRATIVE | Facility: OTHER | Age: 65
End: 2020-03-26

## 2020-03-31 ENCOUNTER — OFFICE VISIT - HEALTHEAST (OUTPATIENT)
Dept: CARDIOLOGY | Facility: CLINIC | Age: 65
End: 2020-03-31

## 2020-03-31 DIAGNOSIS — Z98.890 STATUS POST CATHETER ABLATION OF ATRIAL FIBRILLATION: ICD-10-CM

## 2020-03-31 DIAGNOSIS — I48.0 PAROXYSMAL ATRIAL FIBRILLATION (H): ICD-10-CM

## 2020-03-31 DIAGNOSIS — I48.3 TYPICAL ATRIAL FLUTTER (H): ICD-10-CM

## 2020-03-31 DIAGNOSIS — I10 ESSENTIAL HYPERTENSION, BENIGN: ICD-10-CM

## 2020-04-22 ENCOUNTER — AMBULATORY - HEALTHEAST (OUTPATIENT)
Dept: CARDIOLOGY | Facility: CLINIC | Age: 65
End: 2020-04-22

## 2020-04-22 DIAGNOSIS — I48.0 PAROXYSMAL ATRIAL FIBRILLATION (H): ICD-10-CM

## 2020-05-01 ENCOUNTER — COMMUNICATION - HEALTHEAST (OUTPATIENT)
Dept: CARDIOLOGY | Facility: CLINIC | Age: 65
End: 2020-05-01

## 2020-05-01 DIAGNOSIS — I10 BENIGN ESSENTIAL HYPERTENSION: ICD-10-CM

## 2020-07-31 ENCOUNTER — COMMUNICATION - HEALTHEAST (OUTPATIENT)
Dept: CARDIOLOGY | Facility: CLINIC | Age: 65
End: 2020-07-31

## 2020-08-18 ENCOUNTER — RECORDS - HEALTHEAST (OUTPATIENT)
Dept: LAB | Facility: CLINIC | Age: 65
End: 2020-08-18

## 2020-08-18 LAB
ALBUMIN SERPL-MCNC: 3.7 G/DL (ref 3.5–5)
ALP SERPL-CCNC: 105 U/L (ref 45–120)
ALT SERPL W P-5'-P-CCNC: 23 U/L (ref 0–45)
ANION GAP SERPL CALCULATED.3IONS-SCNC: 9 MMOL/L (ref 5–18)
AST SERPL W P-5'-P-CCNC: 21 U/L (ref 0–40)
BASOPHILS # BLD AUTO: 0 THOU/UL (ref 0–0.2)
BASOPHILS NFR BLD AUTO: 1 % (ref 0–2)
BILIRUB SERPL-MCNC: 0.5 MG/DL (ref 0–1)
BUN SERPL-MCNC: 21 MG/DL (ref 8–22)
CALCIUM SERPL-MCNC: 8.9 MG/DL (ref 8.5–10.5)
CHLORIDE BLD-SCNC: 104 MMOL/L (ref 98–107)
CHOLEST SERPL-MCNC: 138 MG/DL
CO2 SERPL-SCNC: 26 MMOL/L (ref 22–31)
CREAT SERPL-MCNC: 1.09 MG/DL (ref 0.7–1.3)
EOSINOPHIL # BLD AUTO: 0 THOU/UL (ref 0–0.4)
EOSINOPHIL NFR BLD AUTO: 1 % (ref 0–6)
ERYTHROCYTE [DISTWIDTH] IN BLOOD BY AUTOMATED COUNT: 12.7 % (ref 11–14.5)
FASTING STATUS PATIENT QL REPORTED: ABNORMAL
GFR SERPL CREATININE-BSD FRML MDRD: >60 ML/MIN/1.73M2
GLUCOSE BLD-MCNC: 130 MG/DL (ref 70–125)
HCT VFR BLD AUTO: 41.5 % (ref 40–54)
HDLC SERPL-MCNC: 37 MG/DL
HGB BLD-MCNC: 14.3 G/DL (ref 14–18)
IMM GRANULOCYTES # BLD: 0 THOU/UL
IMM GRANULOCYTES NFR BLD: 1 %
LDLC SERPL CALC-MCNC: 70 MG/DL
LYMPHOCYTES # BLD AUTO: 1.1 THOU/UL (ref 0.8–4.4)
LYMPHOCYTES NFR BLD AUTO: 18 % (ref 20–40)
MCH RBC QN AUTO: 32.2 PG (ref 27–34)
MCHC RBC AUTO-ENTMCNC: 34.5 G/DL (ref 32–36)
MCV RBC AUTO: 94 FL (ref 80–100)
MONOCYTES # BLD AUTO: 0.6 THOU/UL (ref 0–0.9)
MONOCYTES NFR BLD AUTO: 9 % (ref 2–10)
NEUTROPHILS # BLD AUTO: 4.5 THOU/UL (ref 2–7.7)
NEUTROPHILS NFR BLD AUTO: 72 % (ref 50–70)
PLATELET # BLD AUTO: 225 THOU/UL (ref 140–440)
PMV BLD AUTO: 9.9 FL (ref 8.5–12.5)
POTASSIUM BLD-SCNC: 4.3 MMOL/L (ref 3.5–5)
PROT SERPL-MCNC: 7.2 G/DL (ref 6–8)
RBC # BLD AUTO: 4.44 MILL/UL (ref 4.4–6.2)
SODIUM SERPL-SCNC: 139 MMOL/L (ref 136–145)
TRIGL SERPL-MCNC: 155 MG/DL
WBC: 6.2 THOU/UL (ref 4–11)

## 2020-08-19 LAB — 25(OH)D3 SERPL-MCNC: 49.4 NG/ML (ref 30–80)

## 2020-08-28 ENCOUNTER — COMMUNICATION - HEALTHEAST (OUTPATIENT)
Dept: CARDIOLOGY | Facility: CLINIC | Age: 65
End: 2020-08-28

## 2020-12-29 ENCOUNTER — COMMUNICATION - HEALTHEAST (OUTPATIENT)
Dept: CARDIOLOGY | Facility: CLINIC | Age: 65
End: 2020-12-29

## 2021-01-11 ENCOUNTER — COMMUNICATION - HEALTHEAST (OUTPATIENT)
Dept: CARDIOLOGY | Facility: CLINIC | Age: 66
End: 2021-01-11

## 2021-01-11 DIAGNOSIS — I48.0 PAROXYSMAL ATRIAL FIBRILLATION (H): ICD-10-CM

## 2021-01-15 ENCOUNTER — COMMUNICATION - HEALTHEAST (OUTPATIENT)
Dept: CARDIOLOGY | Facility: CLINIC | Age: 66
End: 2021-01-15

## 2021-01-15 DIAGNOSIS — I10 BENIGN ESSENTIAL HYPERTENSION: ICD-10-CM

## 2021-01-20 ENCOUNTER — AMBULATORY - HEALTHEAST (OUTPATIENT)
Dept: CARDIOLOGY | Facility: CLINIC | Age: 66
End: 2021-01-20

## 2021-01-20 DIAGNOSIS — I48.0 PAROXYSMAL ATRIAL FIBRILLATION (H): ICD-10-CM

## 2021-01-28 ENCOUNTER — RECORDS - HEALTHEAST (OUTPATIENT)
Dept: ADMINISTRATIVE | Facility: OTHER | Age: 66
End: 2021-01-28

## 2021-01-28 ENCOUNTER — AMBULATORY - HEALTHEAST (OUTPATIENT)
Dept: CARDIOLOGY | Facility: CLINIC | Age: 66
End: 2021-01-28

## 2021-02-03 ENCOUNTER — COMMUNICATION - HEALTHEAST (OUTPATIENT)
Dept: CARDIOLOGY | Facility: CLINIC | Age: 66
End: 2021-02-03

## 2021-02-04 ENCOUNTER — OFFICE VISIT - HEALTHEAST (OUTPATIENT)
Dept: CARDIOLOGY | Facility: CLINIC | Age: 66
End: 2021-02-04

## 2021-02-04 DIAGNOSIS — G47.33 OSA (OBSTRUCTIVE SLEEP APNEA): ICD-10-CM

## 2021-02-04 DIAGNOSIS — I10 ESSENTIAL HYPERTENSION, BENIGN: ICD-10-CM

## 2021-02-04 DIAGNOSIS — I48.0 PAROXYSMAL ATRIAL FIBRILLATION (H): ICD-10-CM

## 2021-02-04 DIAGNOSIS — I48.3 TYPICAL ATRIAL FLUTTER (H): ICD-10-CM

## 2021-02-04 ASSESSMENT — MIFFLIN-ST. JEOR: SCORE: 1563.94

## 2021-03-09 ENCOUNTER — AMBULATORY - HEALTHEAST (OUTPATIENT)
Dept: NURSING | Facility: CLINIC | Age: 66
End: 2021-03-09

## 2021-03-30 ENCOUNTER — AMBULATORY - HEALTHEAST (OUTPATIENT)
Dept: NURSING | Facility: CLINIC | Age: 66
End: 2021-03-30

## 2021-04-11 ENCOUNTER — COMMUNICATION - HEALTHEAST (OUTPATIENT)
Dept: CARDIOLOGY | Facility: CLINIC | Age: 66
End: 2021-04-11

## 2021-04-11 DIAGNOSIS — I10 ESSENTIAL (PRIMARY) HYPERTENSION: ICD-10-CM

## 2021-04-12 RX ORDER — LOSARTAN POTASSIUM 25 MG/1
TABLET ORAL
Qty: 90 TABLET | Refills: 2 | Status: SHIPPED | OUTPATIENT
Start: 2021-04-12 | End: 2021-12-02

## 2021-05-17 ENCOUNTER — DOCUMENTATION ONLY (OUTPATIENT)
Dept: SLEEP MEDICINE | Facility: CLINIC | Age: 66
End: 2021-05-17

## 2021-05-18 NOTE — PROGRESS NOTES
[facility] DREAMWEAR PILLOW M[facility]/HEADGEAR, HIS SLEEP PROVIDER TOLD HIM TO TRY PILLOW MASK.  HE ALSO RECD FILTERS FOR DREAMSTATION CPAP.

## 2021-05-27 NOTE — PROGRESS NOTES
[facility] HEART [facility]  Arrhythmia Clinic  Royer Springer    Referring:      Assessment:         Paroxysmal atrial fibrillation: The patient is 3 months out from his ablation procedure to treat his atrial fibrillation.  He is done well with no recurrent symptoms of palpitations and no ECG documented recurrence of atrial fibrillation or flutter.  Patient is off membrane active antiarrhythmic drug therapy.  The patient remains on oral anticoagulant therapy given his elevated [id number] score.  Long-term the patient is interested in being off his oral anticoagulant therapy and we briefly touched on left atrial appendage occlusion.    Essential hypertension: The patient's blood pressure is borderline today and I am decreasing his beta-blocker dose.  I will institute low-dose ARB therapy today.    Elevated PSA: The patient apparently had an elevated PSA drawn shortly after his ablation procedure.  They are recommending a prostate biopsy.  The patient's Eliquis can be stopped 2 days prior to the biopsy (this is adequate time to completely normalize his coagulation status).  He should resume the Eliquis as soon as possible post biopsy.      Recommendations:    Beta-blocker dose will be cut to Toprol-XL 25 mg daily    Initiate losartan 25 mg daily.    Pre-prostate biopsy:The patient's Eliquis can be stopped 2 days prior to the biopsy (this is adequate time to completely normalize his coagulation status).    I will asked  to review the patient's CT angiogram to see if there is any evidence of coronary calcium.    Follow-up in the A. fib clinic with the EP nurse practitioner in 3 months.      Patient Active Problem List   Diagnosis     Crohn's colitis (H)     Paroxysmal atrial fibrillation (H)     Typical atrial flutter (H)     MAKAYLA (obstructive sleep apnea)     Essential hypertension, benign     Status post catheter ablation of atrial fibrillation       Subjective:  Amilcar MELISSA ChaudharyTremaine (63 y.o. male) returns to  the arrhythmia clinic for interval reevaluation of his atrial fibrillation post ablation.  The patient is 3 months out from his ablation procedure to treat his atrial for ablation.  His recovery was uneventful.  The patient's has had no recurrent palpitations or other symptoms to suggest return of atrial fibrillation or flutter.  The patient reports no exertional chest pain or pressure.  He is not having any orthopnea PND or ankle edema.    Current Outpatient Medications   Medication Sig Dispense Refill     apixaban (ELIQUIS) 5 mg Tab tablet Take 1 tablet (5 mg total) by mouth 2 (two) times a day. Start 12/23/18 1 month prior to ablation 180 tablet 2     ascorbic acid, vitamin C, (ASCORBIC ACID WITH BERNABE HIPS) 500 MG tablet Take 500 mg by mouth daily.       calcium carbonate (OS-MAYLIN) 600 mg calcium (1,500 mg) tablet Take 600 mg by mouth daily.       cholecalciferol, vitamin D3, 1,000 unit tablet Take 1,000 Units by mouth daily.       mercaptopurine (PURINETHOL) 50 mg tablet Take 50 mg by mouth daily.       metoprolol succinate (TOPROL XL) 50 MG 24 hr tablet Take 0.5 tablets (25 mg total) by mouth daily. 90 tablet 1     multivitamin therapeutic tablet Take 1 tablet by mouth daily.       losartan (COZAAR) 25 MG tablet Take 1 tablet (25 mg total) by mouth daily. 30 tablet 3     No current facility-administered medications for this visit.        Review of Systems:   General: WNL  Eyes: WNL  Ears/Nose/Throat: WNL  Lungs: WNL  Heart: WNL  Stomach: WNL  Bladder: WNL  Muscle/Joints: WNL  Skin: WNL  Nervous System: WNL  Mental Health: WNL     Blood: WNL    Family History  Family History   Problem Relation Age of Onset     Pacemaker Mother      CABG Mother 90     No Medical Problems Father      No Medical Problems Sister      No Medical Problems Brother      No Medical Problems Sister      Sudden death Sister 58     Other Sister 16        Motorcycle accident     No Medical Problems Sister      No Medical Problems Brother       "No Medical Problems Brother      No Medical Problems Brother        Social History   reports that he has never smoked. He has never used smokeless tobacco.    Objective:   Vital signs in last 24 hours:  /80 (Patient Site: Left Arm, Patient Position: Sitting, Cuff Size: Adult Regular)   Pulse 91   Resp 20   Ht 5' 7\" (1.702 m)   Wt 181 lb (82.1 kg)   BMI 28.35 kg/m    Weight: Weight: 181 lb (82.1 kg)     Physical Exam:  General: The patient is alert oriented to person place and situation.  The patient is in no acute distress at the time of my evaluation.  Eyes: Pupils are equal, round, and reactive to light.  Conjunctiva and sclera are clear.  ENT: Oral mucosa is moist and without redness. No evident nasal discharge.  Pulmonary: Lungs are clear bilaterally with no rales, rhonchi, or wheezes.    Cardiovascular exam: Rhythm is regular. S1 and S2 are normal. No significant murmur is present. JVP is normal. Lower extremities demonstrate no significant edema. Distal pulses are intact bilaterally.  Abdomen is flat, soft, and nontender.  Musculoskeletal: Spine is straight. Extremities without deformity.  Neuro: Gait is normal.   Skin is warm, dry, and otherwise intact.      Cardiographics:   Patient activated monitor dated March 7, 2019  Indications     Paroxysmal atrial fibrillation (H)   Typical atrial flutter (H)   Conclusion        HOLTER MONITOR REPORT     INTERPRETATION DATE:  03/21/2019:       TEST DATE:  03/07/2019 through 03/21/2019     INTERPRETATION:  Amilcar Penaloza had a patch type patient activated ECG monitor  between 03/07/2019 and 03/21/2019 for paroxysmal atrial fibrillation.  He was quite  compliant with wearing the patch during this time.  Baseline transmission on 03/07  showed normal sinus rhythm, rate 80 to 90 beats per minute, with occasional atrial  premature beats.  No other strips were transmitted during the monitoring period.   ACT heart rates generally ranged between 60 and 100 beats per " minute.  No  symptomatic episodes were reported.  No atrial fibrillation was detected and there  were no pauses greater than 3 seconds.         Lab Results:   Lab Results   Component Value Date     01/17/2019    K 4.5 01/17/2019     01/17/2019    CO2 30 01/17/2019    BUN 21 01/17/2019    CREATININE 1.13 01/17/2019    CALCIUM 9.7 01/17/2019     Lab Results   Component Value Date    WBC 6.8 01/17/2019    HGB 16.0 01/17/2019    HCT 47.0 01/17/2019    MCV 94 01/17/2019     01/17/2019     No results found for: INR      Outside record review:

## 2021-05-28 ENCOUNTER — RECORDS - HEALTHEAST (OUTPATIENT)
Dept: ADMINISTRATIVE | Facility: CLINIC | Age: 66
End: 2021-05-28

## 2021-05-30 ENCOUNTER — HEALTH MAINTENANCE LETTER (OUTPATIENT)
Age: 66
End: 2021-05-30

## 2021-06-01 VITALS — WEIGHT: 179 LBS | BODY MASS INDEX: 27.13 KG/M2 | HEIGHT: 68 IN

## 2021-06-01 VITALS — BODY MASS INDEX: 26.67 KG/M2 | HEIGHT: 68 IN | WEIGHT: 176 LBS

## 2021-06-01 VITALS — WEIGHT: 176 LBS | HEIGHT: 68 IN | BODY MASS INDEX: 26.67 KG/M2

## 2021-06-01 VITALS — WEIGHT: 182.4 LBS | BODY MASS INDEX: 27.02 KG/M2 | HEIGHT: 69 IN

## 2021-06-01 VITALS — BODY MASS INDEX: 29.19 KG/M2 | HEIGHT: 67 IN | WEIGHT: 186 LBS

## 2021-06-01 VITALS — HEIGHT: 67 IN | BODY MASS INDEX: 29.19 KG/M2 | WEIGHT: 186 LBS

## 2021-06-01 NOTE — PATIENT INSTRUCTIONS - HE
Amilcar Penaloza,    It was a pleasure to see you today at the [facility].     My recommendations after this visit include:    Stop metoprolol.    Please call if symptoms of reoccurrence of atrial fibrillation.      To followup with Dr. Springer or myself in 6 months.      My contact information:  Felix Gross CNP  After Hours or Scheduling  [phone]  My Nurse---Jess Nam [phone]

## 2021-06-02 ENCOUNTER — RECORDS - HEALTHEAST (OUTPATIENT)
Dept: ADMINISTRATIVE | Facility: CLINIC | Age: 66
End: 2021-06-02

## 2021-06-02 VITALS — HEIGHT: 67 IN | WEIGHT: 183.1 LBS | BODY MASS INDEX: 28.74 KG/M2

## 2021-06-02 VITALS — HEIGHT: 68 IN | WEIGHT: 183 LBS | BODY MASS INDEX: 27.74 KG/M2

## 2021-06-02 VITALS — BODY MASS INDEX: 28.1 KG/M2 | WEIGHT: 185.4 LBS | HEIGHT: 68 IN

## 2021-06-02 VITALS — BODY MASS INDEX: 29.03 KG/M2 | HEIGHT: 67 IN | WEIGHT: 185 LBS

## 2021-06-02 VITALS — BODY MASS INDEX: 27.74 KG/M2 | WEIGHT: 183 LBS | HEIGHT: 68 IN

## 2021-06-02 VITALS — BODY MASS INDEX: 28.99 KG/M2 | HEIGHT: 67 IN | WEIGHT: 184.7 LBS

## 2021-06-02 VITALS — BODY MASS INDEX: 28.41 KG/M2 | HEIGHT: 67 IN | WEIGHT: 181 LBS

## 2021-06-02 VITALS — WEIGHT: 179 LBS | HEIGHT: 68 IN | BODY MASS INDEX: 27.13 KG/M2

## 2021-06-02 NOTE — TELEPHONE ENCOUNTER
Zestar Study Consent Visit    Study description: ECG and PPG Study: Zestar Study      Amilcar Penaloza a 63 y.o. male , was contacted by today to discuss participation in the Zestar study.     The patient called the Clinical Trials Office to inquire about study participation.  The consent form was reviewed with the patient.     The review of the study included:    Study purpose     Conflict of interest    Device description      Study visits    Risks of participation    Benefits (if any)    Alternatives    Voluntary participation    Confidentiality     Compensation/costs of participation    Study stipends    Injury and legal rights    The subject was queried in regards to his willingness to continue and come in for scheduled appointment. his questions were answered to his satisfaction.   The patient has given his preliminary agreement to volunteer to participate in the above noted study.     Plan: Amilcar Penaloza will come to [facility] on 10/07/2019 to continue consent process. If he continues to agrees to participate, the study visit will be done on the same day.    Leatha Hankins RN

## 2021-06-03 VITALS
HEART RATE: 92 BPM | WEIGHT: 186 LBS | BODY MASS INDEX: 29.19 KG/M2 | DIASTOLIC BLOOD PRESSURE: 78 MMHG | RESPIRATION RATE: 12 BRPM | HEIGHT: 67 IN | TEMPERATURE: 97.9 F | SYSTOLIC BLOOD PRESSURE: 121 MMHG

## 2021-06-03 VITALS
BODY MASS INDEX: 29.03 KG/M2 | DIASTOLIC BLOOD PRESSURE: 80 MMHG | HEIGHT: 67 IN | RESPIRATION RATE: 16 BRPM | WEIGHT: 185 LBS | SYSTOLIC BLOOD PRESSURE: 118 MMHG | HEART RATE: 81 BPM

## 2021-06-05 VITALS
HEIGHT: 67 IN | SYSTOLIC BLOOD PRESSURE: 106 MMHG | HEART RATE: 73 BPM | BODY MASS INDEX: 28.56 KG/M2 | DIASTOLIC BLOOD PRESSURE: 66 MMHG | WEIGHT: 182 LBS | OXYGEN SATURATION: 94 % | RESPIRATION RATE: 16 BRPM

## 2021-06-10 NOTE — TELEPHONE ENCOUNTER
[facility] was called, corresponding information/recommendations reviewed and contact information was given for further concerns/questions   8/10/2020 1:20 PM  Inge Bazan RN

## 2021-06-10 NOTE — TELEPHONE ENCOUNTER
----- Message from Reva Calzada sent at 8/28/2020 11:55 AM CDT -----      Caller: Patient  Primary cardiologist: Dr. Springer    Detailed reason for call: Patient stated that his urologist wanted to do a biopsy procedure and the patient stated that he was told that he should call to see about hold and bridge orders on his apixaban ANTICOAGULANT (ELIQUIS) 5 mg Tab tablet. Please advise    Best phone number: [phone]  Best time to contact: today  Ok to leave a detailed message? yes  Device? no

## 2021-06-10 NOTE — TELEPHONE ENCOUNTER
Reviewed in Felix's absence.  Ok to hold Eliquis for 3 days prior to procedure, no bridging recommended.  Restart ASAP afterwards.   Thanks,   Yissel

## 2021-06-10 NOTE — TELEPHONE ENCOUNTER
Hold Eliquis 2 days prior to procedure, no bridging recommended.  Resume Eliquis ASAP after procedure.  Thanks,  Yissel

## 2021-06-10 NOTE — TELEPHONE ENCOUNTER
Felix,     Patient of yours and Dr. Berrios most recent visit 3/31/20, where he reported no symptoms to suggest recurrent Afib/afutter and is s/p PVI 1/23/19 with a [id number] score of 2.    Can you help with hold orders for upcoming colonscopy on 8/25, they are requesting a 3 day hold.    Thank you  Radha

## 2021-06-10 NOTE — TELEPHONE ENCOUNTER
Yissel-  Can you review in Dr Springer's absence  Pt needing to have a biopsy   Needing hold orders for Eliquis  Hx of PVI in Jan 2019, and no documented reoccurrence of AF since that time  Recently saw Dr Springer in March of this year  YSQ4GS5-OPMs score = 2 (age, HTN)    Ok to hold 3 days prior without bridging?  Please advise  Thank you,  Adamaris

## 2021-06-10 NOTE — TELEPHONE ENCOUNTER
----- Message from Sindy Zamudio sent at 7/31/2020  1:01 PM CDT -----  General phone call: [facility]  Caller: Nurse  Primary cardiologist: Gian  Detailed reason for call: Need holding and bridging for Eloquis- proced on 8/25- requesting 3 day protocol  New or active symptoms?   Best phone number: [phone]- ext: [phone]  Best time to contact:   Ok to leave a detailed message?   Device?    Additional Info:

## 2021-06-11 NOTE — TELEPHONE ENCOUNTER
Pt was called, corresponding information/recommendations reviewed, verbalized understanding, has no further questions at this time and contact information was given for further concerns/questions   These recommendations and guidelines were also faxed to [facility] Dr Lee who is going to be performing the procedure   8/28/2020 1:10 PM  Inge Brandon RN

## 2021-06-14 NOTE — TELEPHONE ENCOUNTER
pc to patient.  Reviewed pharmacy recommendations with patient.  No further questions or concerns.  JW

## 2021-06-14 NOTE — TELEPHONE ENCOUNTER
Pharmacy,     Do you see any interactions with patients med and requested supplement?    Thanks for your help,   Radha BRANTLEY

## 2021-06-14 NOTE — TELEPHONE ENCOUNTER
----- Message from Jeimy Jhaveri V sent at 12/29/2020 11:25 AM CST -----  General phone call: Amilcar has pain in his hand/joints.  He wants to take glucosamine chondroitin, but he wanted to check with Dr. Springer to see if it would be safe to take it with his other medications.      Caller: Amilcar  Primary cardiologist: Emmett  Detailed reason for call: Medication interactions  New or active symptoms? No  Best phone number: [phone]  Best time to contact: Anytime  Ok to leave a detailed message? Yes  Device? No    Additional Info:

## 2021-06-15 NOTE — PATIENT INSTRUCTIONS - HE
Amilcar Penaloza,    It was a pleasure to see you today at the [facility].     My recommendations after this visit include:    Please call symptoms of recurrence of atrial fibrillation.      To followup with Dr. Springer in 1 year.      My contact information:  Felix Gross CNP  After Hours or Scheduling  [phone]  My Nurse---Jess Nam [phone]

## 2021-06-15 NOTE — PROGRESS NOTES
[facility] Note    Assessment:    Amilcar Penaloza is a 62 y.o. old male with MAKAYLA and Crohn disease here for w/u of AF.    Plan:  # AF -  New diagnosis, a paroxysm in PCP office, now back in NSR, unclear onset, CHADSVasc 0 based on what we know, but will check a TTE to evaluate for any e/o LVEF decrease, Holter to evaluate for AF burden  - even though technically he has no need for AC, would favor continuing ASA   - encouraged CPAP compliance  - could start low dose toprol 25 mg prior to knee surgery this upcoming Fri, TTE as per above, otherwise no obvious reason to further postpone it  - continue to monitor and re-assess CHADSVasc scores overt time    F/u in 1 year    PABLO MARTIN  [facility] HEART CARE  [phone]  ______________________________________________________________________    Subjective:  CC: AF    I had the opportunity to see Amilcar Penaloza at the [facility]. Amilcar Penaloza is a 62 y.o. male with a known history of MAKAYLA and Crohn disease here for w/u of AF.    He has generally done well, and has been asymptomatic but at the time of his PCP visit for evaluation prior to a scheduled knee surgery earlier today, when it was discovered on a routine EKG that he has AF.   His history otherwise included - stress test in '06 and w/u that he thinks was essentially unrevealing for occasional dizziness. 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 Not particularly active due to hamstring injury now, so gained ~ 15 lbs in the past year. No CP, SOB, LOPEZ, orthopnea/PND/edema/syncope +  Occasional lightheadedness when he bends over and stands up ~ once a week or less. No edema, abdominal distention, N/V/F/C. Stopped wearing CPAP as he snores less. EKG in PCP office earlier today w/ AF at 112, EKG in our office NSR at  "71.  ______________________________________________________________________    Review of Systems:   As noted in HPI, all others reviewed and are negative    Problem List:  There is no problem list on file for this patient.    Medical History:  No past medical history on file.  Surgical History:  No past surgical history on file.  Social History:  Social History     Social History     Marital status:      Spouse name: N/A     Number of children: N/A     Years of education: N/A     Occupational History     Not on file.     Social History Main Topics     Smoking status: Not on file     Smokeless tobacco: Not on file     Alcohol use Not on file     Drug use: Not on file     Sexual activity: Not on file     Other Topics Concern     Not on file     Social History Narrative     Family History:  No family history on file.    Allergies:  Allergies not on file    Medications:  Current Outpatient Prescriptions   Medication Sig Dispense Refill     mercaptopurine (PURINETHOL) 50 mg tablet Take 50 mg by mouth daily.       No current facility-administered medications for this visit.      Objective:   Vital signs:  /88 (Patient Site: Right Arm, Patient Position: Sitting, Cuff Size: Adult Large)  Pulse 76  Resp 16  Ht 5' 7\" (1.702 m)  Wt 186 lb (84.4 kg)  BMI 29.13 kg/m2  Physical Exam:  GENERAL APPEARANCE: Alert, cooperative and in no acute distress.   HEENT: No scleral icterus. Oral mucuos membranes pink and moist.   NECK: JVP 8. No Hepatojugular reflux. Thyroid not visualized. No lymphadenopathy   CHEST: clear to auscultation   CARDIOVASCULAR: S1, S2 without murmur ,clicks or rubs. Brachial, radial and posterior tibial pulses are intact and symetric. No carotid bruits noted. No edema  ABDOMEN: Nontender. BS+. No bruits.   SKIN: No Xanthelasma   Musculoskeletal: No cyanosis, clubbing or swelling.    Lab Results:  LIPIDS:  Lab Results   Component Value Date    CHOL 129 01/24/2017    CHOL 137 01/19/2016    CHOL 133 " 09/22/2014     Lab Results   Component Value Date    HDL 39 (L) 01/24/2017    HDL 45 01/19/2016    HDL 42 09/22/2014     Lab Results   Component Value Date    LDLCALC 81 01/24/2017    LDLCALC 85 01/19/2016    LDLCALC 83 09/22/2014     Lab Results   Component Value Date    TRIG 45 01/24/2017    TRIG 36 01/19/2016    TRIG 41 09/22/2014     No components found for: CHOLHDL    BMP:  Lab Results   Component Value Date    CREATININE 1.06 01/24/2017    BUN 20 01/24/2017     01/24/2017    K 4.1 01/24/2017     01/24/2017    CO2 24 01/24/2017     [facility]

## 2021-06-16 PROBLEM — I48.3 TYPICAL ATRIAL FLUTTER (H): Status: ACTIVE | Noted: 2018-03-29

## 2021-06-16 PROBLEM — K50.10 CROHN'S COLITIS (H): Status: ACTIVE | Noted: 2018-03-29

## 2021-06-16 PROBLEM — I10 ESSENTIAL HYPERTENSION, BENIGN: Status: ACTIVE | Noted: 2018-06-19

## 2021-06-16 PROBLEM — I48.0 PAROXYSMAL ATRIAL FIBRILLATION (H): Status: ACTIVE | Noted: 2018-03-29

## 2021-06-16 PROBLEM — Z98.890 STATUS POST CATHETER ABLATION OF ATRIAL FIBRILLATION: Status: ACTIVE | Noted: 2019-01-23

## 2021-06-16 PROBLEM — G47.33 OSA (OBSTRUCTIVE SLEEP APNEA): Status: ACTIVE | Noted: 2018-03-29

## 2021-06-17 NOTE — TELEPHONE ENCOUNTER
Telephone Encounter by Inge Brandon RN at 8/28/2020  3:46 PM     Author: Inge Brandon RN Service: -- Author Type: Registered Nurse    Filed: 8/28/2020  3:46 PM Encounter Date: 8/28/2020 Status: Signed    : Inge Brandon RN (Registered Nurse)       Noted.  This was faxed again.  Nayeli Escamilla [facility] Ep Rn Support Pool    Caller: Unspecified (Today,  3:07 PM)               General phone call:     Caller: Amilcar Edmondson     Primary cardiologist: MARIFER     Detailed reason for call: Pt states that the faxed hold and bridge orders for his Eloquis medication was not received by [facility]. Pt is asking for the orders to be resent to fax number [phone]. Please call pt for any questions.     Best phone number: [phone]     Best time to contact: Anytime     Ok to leave a detailed message? Yes     Device? No     Additional Info:

## 2021-06-17 NOTE — PROGRESS NOTES
[facility] HEART [facility]   Arrhythmia Clinic    Assessment/Plan:  Diagnoses and all orders for this visit:    PAF (paroxysmal atrial fibrillation) and Atrial flutter, unspecified type with RVR demonstrated on 24-hour Holter.  Average heart rate in the 24 hours was 100.  Atrial flutter with RVR of 160s-170s on average.  He was cutting wood at the time.  He was symptomatic with fatigue, short of breath and lightheaded.  He was unaware of any other symptoms during  the 24 hour monitor.  There is no diary associated with his Holter but we reviewed holter in detail today.  He had frequent episodes of short A. fib and 2 episodes of atrial flutter.  He recalls last fall when he was hunting elk that he had a day of hunting where he was extremely fatigued and somewhat lightheaded.  He had never had that before.  Now looking back he is thought of some times where he is felt somewhat like that.  Unclear to me if the symptoms are related only to heart rate or the rhythm itself.  I discussed natural progression with atrial fib and flutter and how they are related.  I discussed progression varies quite widely from one individual to another but he appears like he will be more aggressive in progression.  I discussed the association between MAKAYLA untreated and atrial fib.  Hyperthyroidism was ruled out as TSH normal.  No valvular heart disease on echo.  Therefore cause for atrial fib and flutter is likely MAKAYLA untreated.  I discussed rate versus rhythm control decided on symptoms.  I discussed antiarrhythmics and ablation if he symptomatic with atrial arrhythmias.  Due to frequent episodes and fast heart rates with Holter I recommended to further increase metoprolol from 25 to 50 mg orally every day.  To follow-up with me in 3 months.  Will have him follow with EP versus Dr. Muniz.  He can move up appointment with me if he wants to switch from rate to rhythm control.  He has been doing a lot of Internet reading and a lot of  questions were answered.  I gave him some reputable websites to look up more information after the visit.  [id number] score of 0 but will be one within the year.  Discussed low risk for stroke and for now okay to stay on aspirin, but will need anticoagulation at some point.  See HPI for details.  -     metoprolol succinate (TOPROL-XL) 50 MG 24 hr tablet; Take 1 tablet (50 mg total) by mouth daily.  Dispense: 30 tablet; Refill: 6    MAKAYLA (obstructive sleep apnea) and off of CPAP and has gained about 20 pounds since then.  He was having daytime sleepiness with work and is no longer having daytime sleepiness despite being off his CPAP.  Discussed that we will push him hard to have MAKAYLA treated if he wants to follow rhythm control and will be less firm if he is going to follow rate control.    Other orders  -     aspirin 325 MG EC tablet; Take 325 mg by mouth daily.    60 minutes were spent in face-to-face counseling regarding above diagnoses and options for treatment.  ______________________________________________________________________    Subjective:    I had the opportunity to see Amilcar Penaloza at the [facility]. Amilcar Penaloza is a 62 y.o. male and here for EP follow-up secondary to new diagnosis of paroxysmal atrial fibrillation and paroxysmal atrial flutter .  Amilcar Penaloza had a preop physical on February 5, 2018 and diagnosed with atrial fibrillation and then saw cardiologist here in clinic later that same day.  His knee surgery was canceled.  It has been done now and had arthroscopy of the right knee with partial meniscus removal.  He has done well post surgery.  He is not back to all of normal physical activities yet as some knee pain.  He is a retired .  He was instructed to stay on aspirin 325 mg 1 tab orally every day for 1 month and then decrease to 81 mg.  I agree with this.  He has a history of Crohn's colitis which was diagnosed in his 50s.  He has been on baby  aspirin for years.  Due to GI history would continue baby aspirin when able to decrease.  He reports snoring at night and is trying to teach himself to sleep on his side.  He reports that he was also snoring with CPAP on.  He is seeing Dr. Tracy in sleep clinic later on today.  He lives in [place] but comes to the [facility] for medical care.  ______________________________________________________________________    Problem List:  Patient Active Problem List   Diagnosis     Crohn's colitis     PAF (paroxysmal atrial fibrillation)     Atrial flutter     MAKAYLA (obstructive sleep apnea)     Medical History:  No past medical history on file.  Surgical History:  No past surgical history on file.  Social History:  Social History   Substance Use Topics     Smoking status: Never Smoker     Smokeless tobacco: Never Used     Alcohol use Not on file        Review of Systems: Review of Systems:   General: WNL  Eyes: WNL  Ears/Nose/Throat: WNL  Lungs: WNL  Heart: WNL  Stomach: WNL  Bladder: WNL  Muscle/Joints: WNL  Skin: WNL  Nervous System: WNL  Mental Health: WNL     Blood: WNL      Family History:  No family history on file.      Allergies:  Allergies   Allergen Reactions     Sulfa (Sulfonamide Antibiotics) Swelling     Medications:  Current Outpatient Prescriptions   Medication Sig Dispense Refill     ascorbic acid, vitamin C, (ASCORBIC ACID WITH BERNABE HIPS) 500 MG tablet Take 500 mg by mouth daily.       aspirin 325 MG EC tablet Take 325 mg by mouth daily.       calcium carbonate (OS-MAYLIN) 600 mg calcium (1,500 mg) tablet Take 600 mg by mouth daily.       cholecalciferol, vitamin D3, 1,000 unit tablet Take 1,000 Units by mouth daily.       mercaptopurine (PURINETHOL) 50 mg tablet Take 50 mg by mouth daily.       metoprolol succinate (TOPROL-XL) 50 MG 24 hr tablet Take 1 tablet (50 mg total) by mouth daily. 30 tablet 6     multivitamin therapeutic tablet Take 1 tablet by mouth daily.       oxybutynin (DITROPAN XL) 10 MG ER  "tablet 10 mg daily.   1     No current facility-administered medications for this visit.        Objective:   Vital signs:  /82 (Patient Site: Left Arm, Patient Position: Sitting, Cuff Size: Adult Regular)  Pulse 80  Resp 16  Ht 5' 8.5\" (1.74 m) Comment: shoes on  Wt 182 lb 6.4 oz (82.7 kg) Comment: shoes on  BMI 27.33 kg/m2      Physical Exam:    GENERAL APPEARANCE: Alert, cooperative and in no acute distress.  HEENT: No scleral icterus. No Xanthelasma. Oral mucuos membranes pink and moist.  NECK: JVP Nl.   CHEST: clear to auscultation  CARDIOVASCULAR: S1, S2 without murmur ,clicks or rubs. Regular, regular.  Radial and posterior tibial pulses are intact and symetric.   EXTREMITIES: No cyanosis, clubbing or edema.    Results personally reviewed:  Results for orders placed during the hospital encounter of 02/09/18   Echo Complete [ECH10] 02/09/2018    Narrative   No previous study for comparison.    Patient in atrial fibrillation with rapid ventricular response during   the early portions of the examination and appeared to convert to normal   sinus rhythm during the mid portion of the examination.    Normal left ventricular size. Left ventricular systolic function appears   lower limits of normal. Left ventricular ejection fraction estimated   50-55%.    Normal right ventricular size and systolic function.    No significant valve abnormality observed    Cannot exclude small PFO        February 2018 24-hour Holter shows:  INTERPRETATION:  Predominant rhythm is sinus rhythm but patient has frequent  episodes of paroxysmal atrial fibrillation and also atrial flutter with rapid  ventricular response.  Average sinus rates ranged from approximately 54 beats per  minute up to approximately 120 beats per minute.  Patient has occasional atrial  premature beats and short runs of primary atrial tachycardia.  Paroxysmal atrial  fibrillation is relatively frequent with most runs 30 beats seconds to approximately  a " minute in duration with average ventricular response during these times at  approximately 130 to 140 beats per minute.  At times atrial fibrillation organizes  into atrial flutter which was associated with lightheadedness from 3:35 p.m. to  approximately 4:30 p.m.  During these times average ventricular response is  approximately 160 beats per minute.  There were no clinically significant pauses.   Some aberrancy is noted with atrial premature beats.  Occasional ventricular  premature beats were present, 609 over 24 hours.  Symptoms of lightheadedness were  associated with atrial flutter and rapid ventricular response averaging  approximately 160 beats per minute in the early afternoon.  CONCLUSION:  Frequent episodes of paroxysmal atrial fibrillation of short duration  and atrial flutter with rapid ventricular response over for longer periods of time.   Average heart rate was approximately 160 beats per minute between approximately 2  and 4:15 p.m. associated with atrial flutter.    Results for orders placed or performed in visit on 02/05/18   ECG Clinic - Today   Result Value Ref Range    SYSTOLIC BLOOD PRESSURE  mmHg    DIASTOLIC BLOOD PRESSURE  mmHg    VENTRICULAR RATE 71 BPM    ATRIAL RATE 71 BPM    P-R INTERVAL 164 ms    QRS DURATION 90 ms    Q-T INTERVAL 366 ms    QTC CALCULATION (BEZET) 397 ms    P Axis 66 degrees    R AXIS 49 degrees    T AXIS 33 degrees    MUSE DIAGNOSIS       Normal sinus rhythm  Normal ECG  No previous ECGs available  Confirmed by CHRISTOPHER FARRAR MD LOC:[facility] ([id number]) on 2/5/2018 3:33:24 PM         TSH:   Lab Results   Component Value Date    TSH 2.18 02/05/2018     BNP: No results found for: BNP  BMP:  Lab Results   Component Value Date    CREATININE 1.04 02/05/2018    BUN 27 (H) 02/05/2018     02/05/2018    K 4.7 02/05/2018     02/05/2018    CO2 25 02/05/2018       This note has been dictated using voice recognition software. Any grammatical or context distortions are  unintentional and inherent to the software.    WOJCIECH FERRIS RN, [facility] HEART [facility]  [phone]

## 2021-06-20 NOTE — PROGRESS NOTES
1955  Home:[phone] (home) Cell:[phone] (mobile)  Emergency Contact: Sadaf Penaloza [phone]    +++Important patient information for CSC/Cath Lab staff : None+++    [facility] EP Cath Lab Procedure Order   Ablation Type:  PVI- Atrial Fibrillation/Flutter  Specific location of pathway: Left     Diagnosis:  AF  Anticipated Case Duration:  4-Two PVIs in a day   Scheduling Needs/Timeframe:  Schedule on date after Dr Springer apt on 12/5, will need to start anticoagualtion 4 wks prior to procedure    MD Preference: Dr Gian DUMONT Assist:  No Specific MD:  N/A    Current Device: None None  Device Company/Device Rep Needed for Procedure: None    Pre-Procedural Testing needed: JUSTICE, Pulmonary Vein CT/MRI  Mapping System Required:  RAVEN  ICE Needed:  Yes  Special equipment/Staff needed for case: None  Anesthesia:  General-Whole Case  Research Protocol:  No    [facility] EP Cath Lab Prep   Ordering Provider: Dr Springer  Ordering Date: 9/20/2018  Orders Status: Intial order placed and Order set placed  EP NC Contact: Adamaris Brandon RN    H&P:  Schedule apt with EP NP to complete within 1 wk of scheduled PVI  PCP: Esa Maciel MD, [phone]    Pre-op Labs: Done within 7 days    Medical Records Pertinent for Procedure:  Holter 8/3/18, Echo 8/3/18 and EKG 2/5/18    Patient Education:    Teach with Patient: Teach with pt will be completed same day as pre-op H&P-see additional clinic note    Risks Reviewed:     Pulmonary Vein/AF/Radiofrequency Ablation  In addition to standard risks for Radiofrequency Ablation, there is:    <2% for significant pulmonary vein stenosis    <2% risk for embolic events    <1% risk for esophageal fistula    <1% risk death    Pulmonary Vein Isolation / Cryoablation Risks:    1-2% risk for phrenic nerve paralysis    <1% risk for pulmonary vein stenosis    Risk of esophageal irritation with no incidence of atrial-esophageal fistula    Rare cryoballoon rupture    <1% risk death         Cardiac Catheter  Ablation    <1% risk for the following: hypotension, hemorrhage, vascular injury including perforation of vein, artery or heart, thrombophlebitis, systemic or pulmonic emboli; cardiac perforation, (tamponade), infection, pneumothorax, arrhythmias, proarrhythmic effects of drugs, radiation exposure.    1-2% complete heart block (for AVNRT or septol accessory pathway).    <0.5% CVA or MI    <0.1% death    If external defibrillation is needed, 75% risk for superficial burn.    1-2% tamponade and aortic puncture with left sided transeptal approach for left side RAVEN - increase risk of CVA to <2%.    Late arrhythmia recurrences depends upon the primary rhythm disturbance.      Consent: Will be obtained in [place] day of procedure    Pre-Procedure Instructions that were Reviewed with Patient:  NPO after midnight, Remove all jewelry prior to coming in for procedure, Shower prior to arrival, Notified patient of time and date of procedure by CV , Transportation arrangements needed s/p procedure, Post-procedure follow up process, Sedation plan/orders and Pre-procedure letter was sent to pt by CV     Pre-Procedure Medication Instructions:  Instructions given to pt regarding anticoagulants: Pt will be started on anticoagulant 1mo prior to PVI- instructed to continue anticoagulation uninterrupted through their procedure  Instructions given to pt regarding antiarrhythmic medication: Beta Blocker; Pt instructed to continue medication prior to procedure  Instructions for medication, other than anticoagulants/antiarrhythmics listed above, given to pt: to hold all morning medications    Allergies   Allergen Reactions     Sulfa (Sulfonamide Antibiotics) Swelling       Current Outpatient Prescriptions:      ascorbic acid, vitamin C, (ASCORBIC ACID WITH BERNABE HIPS) 500 MG tablet, Take 500 mg by mouth daily., Disp: , Rfl:      aspirin 81 MG EC tablet, Take 81 mg by mouth daily., Disp: , Rfl:      calcium carbonate (OS-MAYLIN)  600 mg calcium (1,500 mg) tablet, Take 600 mg by mouth daily., Disp: , Rfl:      cholecalciferol, vitamin D3, 1,000 unit tablet, Take 1,000 Units by mouth daily., Disp: , Rfl:      mercaptopurine (PURINETHOL) 50 mg tablet, Take 50 mg by mouth daily., Disp: , Rfl:      metoprolol succinate (TOPROL XL) 50 MG 24 hr tablet, Take 1 tablet (50 mg total) by mouth daily., Disp: 7 tablet, Rfl: 0     metoprolol succinate (TOPROL-XL) 50 MG 24 hr tablet, Take 1 tablet (50 mg total) by mouth daily., Disp: 30 tablet, Rfl: 6     multivitamin therapeutic tablet, Take 1 tablet by mouth daily., Disp: , Rfl:      oxybutynin (DITROPAN XL) 15 MG 24 hr tablet, Take 15 mg by mouth daily., Disp: , Rfl: 3    Documentation Date:9/20/2018 10:10 AM  Inge Brandon RN

## 2021-06-22 NOTE — PROGRESS NOTES
[facility] HEART [facility]  Arrhythmia Clinic  Royer KALE Gian    Referring:      Assessment:         Paroxysmal atrial fibrillation: The patient has had minimal symptoms since his last clinic visit.  He had questions prior to moving ahead with his PVI which is scheduled for January.  These were answered and the patient is prepared to move forward.    Sleep apnea: The patient remains compliant with his CPAP therapy.    Essential hypertension: The patient's diastolic blood pressure is borderline today.  He follows closely with his primary care physician Dr. Esa Maciel for this problem.      Recommendations:    No change in current medications    Preprocedural cardiac MRI/CT for left atrial and pulmonary vein anatomy.      Patient Active Problem List   Diagnosis     Crohn's colitis (H)     PAF (paroxysmal atrial fibrillation) (H)     Typical atrial flutter (H)     MAKAYLA (obstructive sleep apnea)     Essential hypertension, benign       Subjective:  Amilcar Penaloza (63 y.o. male) returns to the arrhythmia clinic for interval reevaluation of his paroxysmal atrial fibrillation.  The patient reports no sustained episodes of atrial fibrillation since his last interval visit.  He has had some awareness of his heartbeat but this was related to the stress surrounding the recent passing of his sister.  The patient reports no other new cardiac symptoms.  He has multiple questions regarding his upcoming PVI procedure.  These were answered as well as his wife's.    Current Outpatient Medications   Medication Sig Dispense Refill     apixaban (ELIQUIS) 5 mg Tab tablet Take 1 tablet (5 mg total) by mouth 2 (two) times a day. Start 12/23/18 1 month prior to ablation 180 tablet 2     ascorbic acid, vitamin C, (ASCORBIC ACID WITH BERNABE HIPS) 500 MG tablet Take 500 mg by mouth daily.       aspirin 81 MG EC tablet Take 81 mg by mouth daily.       calcium carbonate (OS-MAYLIN) 600 mg calcium (1,500 mg) tablet Take 600 mg by mouth  "daily.       cholecalciferol, vitamin D3, 1,000 unit tablet Take 1,000 Units by mouth daily.       mercaptopurine (PURINETHOL) 50 mg tablet Take 50 mg by mouth daily.       metoprolol succinate (TOPROL XL) 50 MG 24 hr tablet Take 1 tablet (50 mg total) by mouth daily. 90 tablet 1     multivitamin therapeutic tablet Take 1 tablet by mouth daily.       oxybutynin (DITROPAN XL) 15 MG 24 hr tablet Take 15 mg by mouth daily.  3     No current facility-administered medications for this visit.        Review of Systems:   General: WNL  Eyes: WNL  Ears/Nose/Throat: WNL  Lungs: WNL  Heart: WNL  Stomach: WNL  Bladder: WNL  Muscle/Joints: WNL  Skin: WNL  Nervous System: WNL  Mental Health: WNL     Blood: WNL    Family History  Family History   Problem Relation Age of Onset     Pacemaker Mother      CABG Mother 90     No Medical Problems Father      No Medical Problems Sister      No Medical Problems Brother      No Medical Problems Sister      Sudden death Sister 58     Other Sister 16        Motorcycle accident     No Medical Problems Sister      No Medical Problems Brother      Other Brother         Twins with Esa     Other Brother        Social History   reports that  has never smoked. he has never used smokeless tobacco.    Objective:   Vital signs in last 24 hours:  /86 (Patient Site: Left Arm, Patient Position: Sitting, Cuff Size: Adult Large)   Pulse 64   Resp 16   Ht 5' 7.75\" (1.721 m)   Wt 183 lb (83 kg)   BMI 28.03 kg/m    Weight: Weight: 183 lb (83 kg)     Physical Exam:  General: The patient is alert oriented to person place and situation.  The patient is in no acute distress at the time of my evaluation.  Eyes: Pupils are equal, round, and reactive to light.  Conjunctiva and sclera are clear.  ENT: Oral mucosa is moist and without redness. No evident nasal discharge.  Pulmonary: Lungs are clear bilaterally with no rales, rhonchi, or wheezes.    Cardiovascular exam: Rhythm is regular. S1 and S2 are " normal. No significant murmur is present. JVP is normal. Lower extremities demonstrate no significant edema. Distal pulses are intact bilaterally.  Abdomen is flat, soft, and nontender.  Musculoskeletal: Spine is straight. Extremities without deformity.  Neuro: Gait is normal.   Skin is warm, dry, and otherwise intact.      Cardiographics:       Lab Results:   Lab Results   Component Value Date     02/05/2018    K 4.7 02/05/2018     02/05/2018    CO2 25 02/05/2018    BUN 27 (H) 02/05/2018    CREATININE 1.04 02/05/2018    CALCIUM 9.6 02/05/2018     Lab Results   Component Value Date    WBC 6.0 01/24/2017    HGB 15.2 01/24/2017    HCT 43.6 01/24/2017    MCV 93 01/24/2017     01/24/2017     No results found for: INR      Outside record review:

## 2021-06-22 NOTE — PROGRESS NOTES
Saw patient in clinic per Dr. Springer.  Pt has had an MRI ordered to evaluate pulmonary veins prior to PVI with Dr. Springer.  Pt has turned 63 since the order for MRI was placed, insurance will no longer cover an MRI and a CT was ordered per Dr. Springer.  MRI order cancelled and CT order placed.  Pt will be contacted to scheduled.  Pt has multiple questions regarding his upcoming ablation, these were all answered and reviewed contact information for further questions.  Pt has picked up his prescription for Eliquis and confirmed that he will start on 12-23-18.  He has a 3 month supply and this was affordable for him.

## 2021-06-23 NOTE — PATIENT INSTRUCTIONS - HE
Your anticoagulation medication Eliquis:    It is important to remain on your anticoagulation medication uninterrupted after your ablation to reduce your risk of a stroke or heart attack, do not stop this medication    Please remain on your aspirin for 1 month, then you can stop    Healing from your pulmonary vein ablation:    Stay well hydrated, and increase your fluid intake during this recovery period    High protein foods aide in your bodies healing process    No aggressive or aerobic activity for 7-10 days, and do not lift more than 10 pounds for 7 days     Increase your activity gradually over the next 5-10 days, working back to your normal daily activity    Please call me if any of the following occur:    Episodes of Atrial Fibrillation lasting greater than 4 hours, or if you notice the episodes are increasing in frequency or duration    If you develop shortness of breath, dizziness, or unresolving chest pains     Changes at your groin sites including swelling, hardening, drainage, increase in bruising, or an increase in pain    If you develop a temperature greater than 100.5 degrees (especially weeks 2-5 post     Procedure)      Call 911 if you are having symptoms of a stroke; difficulty with your speech, problems walking, difficulty with balance, vision disturbances, facial drooping or numbness, and muscle weakness on one side of your body     Your follow up appointments are as follows:    You will be seen by the electrophysiologist nurse practitioner in 6 weeks    You will have a 14 day ambulatory cardiac monitor in 3 months to assess your rhythm, this will be scheduled at your appointment with the electrophysiologist nurse practitioner    You will be seen by the electrophysiologist nurse practitioner again in 3 months    Sincerely,  Tammy Peña RN [phone]    After hours please contact the on call service at # [phone]

## 2021-06-23 NOTE — ANESTHESIA CARE TRANSFER NOTE
Last vitals:   Vitals:    01/23/19 1127   BP: 102/66   Pulse: 70   Resp: 10   Temp: 36.5  C (97.7  F)   SpO2: 100%     Patient's level of consciousness is unresponsive.  Spontaneous respirations: yes  Maintains airway independently: yes with oral airway in place.  Dentition unchanged: yes  Oropharynx: Oral airway in place.    QCDR Measures:  ASA# 20 - Surgical Safety Checklist: WHO surgical safety checklist completed prior to induction    PQRS# 430 - Adult PONV Prevention: 4558F - Pt received => 2 anti-emetic agents (different classes) preop & intraop  ASA# 8 - Peds PONV Prevention: NA - Not pediatric patient, not GA or 2 or more risk factors NOT present  PQRS# 424 - Kathleen-op Temp Management: 4559F - At least one body temp DOCUMENTED => 35.5C or 95.9F within required timeframe  PQRS# 426 - PACU Transfer Protocol: - Transfer of care checklist used  ASA# 14 - Acute Post-op Pain: ASA14B - Patient did NOT experience pain >= 7 out of 10   1115:  Exchanging adequately with oral airway, to 4000 with monitors on.    1122:  Report to RN.

## 2021-06-23 NOTE — ANESTHESIA PREPROCEDURE EVALUATION
Anesthesia Evaluation      Patient summary reviewed   No history of anesthetic complications     Airway   Mallampati: III  Neck ROM: full   Pulmonary - normal exam    breath sounds clear to auscultation  (+) sleep apnea on CPAP, ,                          Cardiovascular   Exercise tolerance: > or = 4 METS  (+) hypertension, dysrhythmias, ,     ECG reviewed  Rhythm: irregular  Rate: abnormal,         Neuro/Psych - negative ROS     Endo/Other - negative ROS      GI/Hepatic/Renal - negative ROS           Dental    (+) poor dentition and chipped    Comment: partial              Left Ventricle: Normal left ventricular size and systolic function.The calculated left ventricular ejection fraction is 61%. This represents a normal ejection fraction. The left ventricular wall thickness is normal. E/e' < 8, suggesting normal LV filling pressure.Normal left atrial pressure.    The left ventricular wall motion is normal.    Right Ventricle: Normal right ventricular size and systolic function. TAPSE is normal, which is consistent with normal right ventricular systolic function.     Compared to prior study performed on February 9, 2018, the left ventricular ejection fraction is mildly improved on the current study.  On the prior study, the patient appeared to start in atrial fibrillation and then converted to sinus rhythm.  On the current study the patient appears to be in sinus bradycardia and normal sinus rhythm for the entirety of the study.              Anesthesia Plan  Planned anesthetic: general endotracheal  Surgeon requests no relaxants due to phrenic nerve integrity monitoring.  Will run a remifentanil gtt for cardiac stability and depth of anesthesia if needed    Please have esmolol drawn up.  ASA 4   Induction: intravenous   Anesthetic plan and risks discussed with: patient  Anesthesia plan special considerations: antiemetics,   Post-op plan: routine recovery

## 2021-06-23 NOTE — PATIENT INSTRUCTIONS - HE
Amilcar Penaloza,    It was a pleasure to see you today at the [facility].     My recommendations after this visit include:    Stop aspirin.      January 20-Start Famotidine 20 mg orally twice a day (3 days before ablation) and continue for 1-3 weeks after.  Bring your CPAP with you to the hospital the day of your ablation.    You will have a post op check in clinic on: January 28    After PVI:  1.  It is important to continue your Eliquis before and after procedure and important not to miss any tablets to prevent a stroke.    2.  Please call if you are frequently out of rhythm or episodes are longer than 6 hours.  Also call if you have ankle, facial or hand swelling noted after procedure.  It is not uncommon to have some chest discomfort the first week but if this continues or reoccurs after the first week, please call.  Call if you have a fever, difficulty with heartburn, throat issues beyond the first week or trouble swallowing or tolerating food.      3.  Also call if any abnormal symptoms for you in the first 4 weeks post ablation.      Please follow up with me in about 7 weeks.    My contact information:  Felix Gross CNP  After Hours or Scheduling  [phone]  Dr. Springer's Nurse---Cyn June, [phone]

## 2021-06-23 NOTE — PROGRESS NOTES
PVI education was completed:     See documented H&P completed by NP in EPIC    Reviewed pre and post op PVI procedure with pt, pre-procedure letter reviewed and given to pt- see letter in EPIC under documentation, see additional EP PVI prep note for details on procedure/prep, answered pt questions and concerns regarding procedure, consent was signed, time spent with pt was >45min and reviewed available pre-op lab results    Discussed importance of continuing anticoagulation uninterrupted pre and post ablation, pt denies missing any doses of their anticoagulant, pt denies issues with quiroz placement in the past, instructions given to pt to start Pepcid 20mg BID 3 days prior to PVI, and to continue 3 wks s/p PVI and perscription was sent for pepcid 20mg BID     Pt had concerns regarding why JUSTICE was ordered, per Dr. Springer's protocol pt does not meet requirements for JUSTICE prior to PVI.  Explained to the patient and his wife that if for some reason he ends up missing a dose of Eliquis between now and his procedure that we will need to reschedule JUSTICE.     1/17/2019 11:18 AM  Tammy Peña RN

## 2021-06-23 NOTE — PROGRESS NOTES
Post Procedural PVI Follow Up Visit    Pt is seen in clinic today status post PVI with Dr Springer on 1/23/19.     General Assessment:   Pts vital signs stable, weight compared to pre procedural weight and is stable, lung sounds clear, heart rate regular, heart sounds S1 and S2 present, palpable radial and pedal pulses and no edema/fluid retention noted.   Pt denies generalized or localized pain abnormal to healing s/p, difficulty swallowing, SOB, thoat pain, heart burn, chest pain, urinary retention/difficulty and s/s of infection.  Pt is tolerating advancement in activity well, staying well hydrated, has a good appetite, eating well balanced meals and gradually working into baseline activity.     Rhythm Assessment:   Pt denies palpitations, irregularities in HR or rhythm and symptoms or sustained AF episodes.    Procedure Site Assessment:   Pts right groin has 2 puncture sites, is C/D/I, no drainage, moderate amount of bruising noted around puncture sites, 1 suture in place, suture removed, groin is soft, and pt denies pain at the site. Left groin has 2 puncture sites, is C/D/I, no drainage, moderate amount of amount of bruising noted around puncture site, 1 suture in place, sutures removed, groin is soft, and pt denies pain at the site. No bruits we heard bilaterally, and pt has strong bilateral femoral and pedal pulses present. All puncture sites were left open to air.    Anticoagulation/Medication:  Pt was instructed to remain on Eliquis without interruption until seen for 3mo follow-up, for further instructions/managment.  Reviewed with pt importance of anticoagulation s/p PVI, reviewed with pt s/s of bleeding while on anticoagulation s/p PVI and encouraged to call with any questions or concerns about anticoagulants  Pt will continue ASA 81mg Daily for 1 mo s/p PVI    Education completed with pt at this visit:  Reviewed post-op PVI healing process, follow up office visit requirements, physical restrictions,  nutritional requirements, when to contact EP-RN/EP-MD, contact information was given to the pt for further concerns or questions and pt verbalized understanding    1/28/2019 9:46 AM  Tammy Peña RN

## 2021-06-24 NOTE — PATIENT INSTRUCTIONS - HE
Amilcar Penaloza,    It was a pleasure to see you today at the [facility].     My recommendations after this visit include:    Stop aspirin.    Make an appointment to see Dr. Springer in 6-8 weeks.  ( to send to [facility] to schedule if none available now.)    Please schedule 2 week one patch monitor to be placed at least a month before your appointment with Dr. Springer.   It can be placed at [facility], [facility] or [facility] and you mail it back when you are done.  Call if you have any issues with your insurance covering the monitor cost.      Please call if you have any symptoms of atrial fibrillation or flutter especially before the monitor is placed.      See sleep physician as you plan.    My contact information:  Felix Gross CNP  After Hours or Scheduling  [phone]  My Nurse---Kya Nam [phone]

## 2021-06-26 NOTE — PROGRESS NOTES
Progress Notes by Maday Gross CNP at 9/17/2018  9:50 AM     Author: Maday Gross CNP Service: -- Author Type: Nurse Practitioner    Filed: 9/17/2018 12:23 PM Encounter Date: 9/17/2018 Status: Signed    : Maday Gross CNP (Nurse Practitioner)          Click to link to [facility] Heart [facility] HEART [facility] ELECTROPHYSIOLOGY NOTE      Assessment/Recommendations   Assessment/Plan:    Diagnoses and all orders for this visit:    PAF (paroxysmal atrial fibrillation) (H) and Typical atrial flutter (H) and 24-hour Holter shows about 10% burden and previously had been 28% burden on Holter in February.  He has very frequent episodes which are short of both atrial fibrillation and typical atrial flutter.  Heart rates appear better controlled now than they did in February.  Amilcar is reporting symptoms off and on of lightheadedness, foggy feeling in his head and general stomach discomfort and at times his wife is verified that he is rapid when he is having the symptoms.  She listens with the stethoscope.  He tells me he cannot hear his heart with it though so he can check for himself.  They have made this correlation on a number of occasions now.  He has these symptoms off and on most days since June.  Therefore it appears that he symptomatic with atrial arrhythmias.  Dr. Springer has discussed pulmonary vein isolation ablation and Amilcar tells me today that he wants to have ablation.  He has good recall of conversation with Dr. Springer.  He had multiple questions and time was spent answering those with regard to ablation.  I discussed need for CT or MRI of pulmonary veins prior to PVI.  Amilcar is going on Medicare and is decided he wants to see Dr. Springer 1 more time before he has ablation.  He would like to see Dr. Springer in December and have ablation in January.  This would require moving ahead with scheduling ablation date had of a clinic appointment.  He is clear that he wants to do this so  okay to schedule.    Essential hypertension, benign and well-controlled.    MAKAYLA (obstructive sleep apnea) and uses CPAP most nights.  He had one night where his grandchild slept with him so he did not use CPAP.    [id number] score of 1 and will be to in October and guidelines recommend he be on anticoagulation.  Discussed anticoagulation is needed for at least 4 months with ablation.  He would start t one month prior to ablation.  He has a history of ulcerative colitis and is very reluctant to go on anticoagulation.  Unfortunately he does not qualify for the watchman by regular criteria due to [id number] score and watchman study which accepted patients lower risk patients is now closed.    I have asked Amilcar to check with his gastroenterologist if he has preferences of Eliquis versus Pradaxa.  I wrote out doses and guidelines regarding administration.  I would recommend Eliquis because it has 40% less risk of other bleeding but Pradaxa has IV reversal and 1 of these would be my recommendation.  Amilcar is willing to go on anticoagulation for the 4 months required for ablation.  Follow up in clinic with Dr. Springer as planned in December as believes best to be seen by physician and not NP.       History of Present Illness    Mr. Amilcar Penaloza is a very pleasant 62 y.o. male who comes in today for EP follow-up regarding paroxysmal atrial fib and now typical atrial flutter seen on 24-hour Holter.  Amilcar Penaloza was diagnosed with paroxysmal atrial fib which is found incidentally at the time of the preop H&P prior to his knee surgery.  He is retired  and has Crohn's disease which is been stable for years.  He follows with Dr. Amilcar Jaquez at [place] [facility].  He occasionally has blood in his stool.  He has severe obstructive sleep apnea and using CPAP.  See above for details.  Amilcar is complaining off and on and off symptoms of lightheadedness, foggy feeling in his head and some vague stomach  discomfort but not heartburn.  The symptoms are off and on and his wife is been able to identify at times that he is in A. fib when he is having these symptoms.  He is asking a lot of questions about ablation which he discussed with Dr. Springer and also about second opinion at [facility].    Cardiographics (personally reviewed):  Results for orders placed during the hospital encounter of 08/03/18   Echo Complete [ECH10] 08/03/2018    Narrative   Left Ventricle: Normal left ventricular size and systolic function.The   calculated left ventricular ejection fraction is 61%. This represents a   normal ejection fraction. The left ventricular wall thickness is normal.   E/e' < 8, suggesting normal LV filling pressure.Normal left atrial   pressure.    The left ventricular wall motion is normal.    Right Ventricle: Normal right ventricular size and systolic function.   TAPSE is normal, which is consistent with normal right ventricular   systolic function.     Compared to prior study performed on February 9, 2018, the left   ventricular ejection fraction is mildly improved on the current study.  On   the prior study, the patient appeared to start in atrial fibrillation and   then converted to sinus rhythm.  On the current study the patient appears   to be in sinus bradycardia and normal sinus rhythm for the entirety of the   study.       August 2018 24-hour Holter shows sinus rhythm .  9 minutes of A. fib was a longest but frequent episodes of paroxysmal atrial tachycardia typical atrial flutter and A. fib.  It estimated burden at 10%.  This is in comparison to Holter which showed about 28% burden in February and also having frequent episodes of atrial fib at that time.  I added typical atrial flutter to diagnostic codes due to this Holter.    Results for orders placed or performed in visit on 02/05/18   ECG Clinic - Today   Result Value Ref Range    SYSTOLIC BLOOD PRESSURE  mmHg    DIASTOLIC BLOOD PRESSURE  mmHg     VENTRICULAR RATE 71 BPM    ATRIAL RATE 71 BPM    P-R INTERVAL 164 ms    QRS DURATION 90 ms    Q-T INTERVAL 366 ms    QTC CALCULATION (BEZET) 397 ms    P Axis 66 degrees    R AXIS 49 degrees    T AXIS 33 degrees    MUSE DIAGNOSIS       Normal sinus rhythm  Normal ECG  No previous ECGs available  Confirmed by CHRISTOPHER FARRAR MD LOC:[facility] ([id number]) on 2/5/2018 3:33:24 PM            Problem List:  Patient Active Problem List   Diagnosis   ? Crohn's colitis (H)   ? PAF (paroxysmal atrial fibrillation) (H)   ? Typical atrial flutter (H)   ? MAKAYLA (obstructive sleep apnea)   ? Essential hypertension, benign       Physical Examination Review of Systems   Vitals:    09/17/18 0958   BP: 110/78   Pulse: 70   Resp: 16     Body mass index is 27.42 kg/(m^2).  Wt Readings from Last 3 Encounters:   09/17/18 179 lb (81.2 kg)   08/03/18 176 lb (79.8 kg)   07/24/18 176 lb (79.8 kg)     General Appearance:   Alert, well-appearing and in no acute distress.   HEENT: Atraumatic, normocephalic.  No scleral icterus, normal conjunctivae; mucous membranes pink and moist.     Chest: Chest symmetric, spine straight.   Lungs:   Respirations unlabored: Lungs are clear to auscultation.   Cardiovascular:   Normal first and second heart sounds with no murmurs, rubs, or gallops.  Regular, regular.  Radial and posterior tibial pulses are intact.  Normal JVD, no edema.       Extremities: No cyanosis or clubbing   Musculoskeletal: Moves all extremities   Skin: Warm, dry, intact.    Neurologic: Mood and affect are appropriate, alert and oriented to person, place, time, and situation    General: WNL  Eyes: WNL  Ears/Nose/Throat: WNL  Lungs: WNL  Heart: Irregular Heartbeat  Stomach: Nausea  Bladder: Frequent Urination at Night  Muscle/Joints: WNL  Skin: WNL  Nervous System: Dizziness, Loss of Balance  Mental Health: WNL     Blood: WNL       Medical History  Surgical History Family History Social History   Past Medical History:   Diagnosis Date   ? MAKAYLA  (obstructive sleep apnea) 3/29/2018    Was previously on CPAP but not using   ? PAF (paroxysmal atrial fibrillation) (H) 3/29/2018    Dx Feb 2018 Mild symptoms, mild CM RVR + flutter [id number] score = 1 (HTN)   ? Typical atrial flutter (H) 3/29/2018    Symptomatic (Holter) Light headed with RVR (160 bpm)    Past Surgical History:   Procedure Laterality Date   ? NOSE SURGERY     ? TRANSURETHRAL RESECTION OF PROSTATE     ? VASECTOMY      Family History   Problem Relation Age of Onset   ? Pacemaker Mother    ? CABG Mother 90   ? No Medical Problems Father    ? No Medical Problems Sister    ? No Medical Problems Brother    ? No Medical Problems Sister    ? No Medical Problems Sister    ? Other Sister 16     Motorcycle accident   ? No Medical Problems Sister    ? No Medical Problems Brother    ? Other Brother      Twins with Esa   ? Other Brother     Social History     Social History   ? Marital status:      Spouse name: N/A   ? Number of children: N/A   ? Years of education: N/A     Occupational History   ? retired       Social History Main Topics   ? Smoking status: Never Smoker   ? Smokeless tobacco: Never Used   ? Alcohol use Not on file   ? Drug use: Not on file   ? Sexual activity: Not on file     Other Topics Concern   ? Not on file     Social History Narrative          Medications  Allergies   Current Outpatient Prescriptions   Medication Sig Dispense Refill   ? ascorbic acid, vitamin C, (ASCORBIC ACID WITH BERNABE HIPS) 500 MG tablet Take 500 mg by mouth daily.     ? aspirin 81 MG EC tablet Take 81 mg by mouth daily.     ? calcium carbonate (OS-MAYLIN) 600 mg calcium (1,500 mg) tablet Take 600 mg by mouth daily.     ? cholecalciferol, vitamin D3, 1,000 unit tablet Take 1,000 Units by mouth daily.     ? mercaptopurine (PURINETHOL) 50 mg tablet Take 50 mg by mouth daily.     ? metoprolol succinate (TOPROL XL) 50 MG 24 hr tablet Take 1 tablet (50 mg total) by mouth daily. 7 tablet 0   ? multivitamin  therapeutic tablet Take 1 tablet by mouth daily.     ? oxybutynin (DITROPAN XL) 15 MG 24 hr tablet Take 15 mg by mouth daily.  3   ? metoprolol succinate (TOPROL-XL) 50 MG 24 hr tablet Take 1 tablet (50 mg total) by mouth daily. 30 tablet 6     No current facility-administered medications for this visit.       Allergies   Allergen Reactions   ? Sulfa (Sulfonamide Antibiotics) Swelling      Medical, surgical, family, social history, and medications were all reviewed and updated as necessary.   Lab Results    Chemistry CBC/INR CHOLESTROL   Lab Results   Component Value Date    CREATININE 1.04 02/05/2018    BUN 27 (H) 02/05/2018     02/05/2018    K 4.7 02/05/2018     02/05/2018    CO2 25 02/05/2018     Creatinine (mg/dL)   Date Value   02/05/2018 1.04   01/24/2017 1.06   01/19/2016 0.97   09/22/2014 1.06     No results found for: BNP Lab Results   Component Value Date    WBC 6.0 01/24/2017    HGB 15.2 01/24/2017    HCT 43.6 01/24/2017    MCV 93 01/24/2017     01/24/2017     No results found for: INR   Lab Results   Component Value Date    CHOL 136 02/13/2018    HDL 40 02/13/2018    LDLCALC 86 02/13/2018    TRIG 52 02/13/2018          Total Time- 45 minutes with greater than 50% spent talking to patient and family regarding patient's relevant diagnoses.  This note has been dictated using voice recognition software. Any grammatical, typographical, or context distortions are unintentional and inherent to the software.    Maday Gross RN,  [facility]   Electrophysiology  [phone]

## 2021-06-26 NOTE — PROGRESS NOTES
Progress Notes by Royer Springer MD at 7/24/2018  7:50 AM     Author: Royer Springer MD Service: -- Author Type: Physician    Filed: 7/24/2018 10:14 PM Encounter Date: 7/24/2018 Status: Signed    : Royer Springer MD (Physician)           Click to link to [facility] Heart [facility] HEART CARE NOTE    Thank you, , for asking the [facility] team to see Mr. Amilcar Penaloza to evaluate treatment options for paroxysmal atrial fibrillation.      Assessment/Recommendations   Assessment:      Paroxysmal atrial fibrillation: This was diagnosed earlier this year incidentally during a preop evaluation.  Subsequently the patient has had some documentation of relatively mild symptoms of lightheadedness which correlated atrial flutter with a ventricular response of 160 bpm during Holter monitoring.  The patient's left ventricular systolic performance is at the lower limits of normal to possibly mildly reduced.  The patient has been back to the sleep clinic and has a new sleep apnea machine and is compliant with therapy at this time.  I have suggested that we remeasure his burden of atrial fibrillation now that his sleep apnea is treated and also reassess his left ventricular systolic performance.  If the patient continues to show diminished left ventricular systolic performance and I would strongly consider ablation in this patient.  The rationale for ablation as well as the risks benefits and expected outcomes were discussed with the patient.    Obstructive sleep apnea: As noted above the patient has a new CPAP device and is compliant with therapy.    Hypertension: The patient's blood pressure is at target on his current medical therapy.    Plan:    The patient will undergo Holter monitoring as well as a echocardiogram to reassess his rhythm and left ventricular systolic performance.    We will plan to contact the patient following these tests to determine next steps.               "    History of Present Illness/Subjective    Mr. Amilcar Penaloza is a 62 y.o. male with history of atrial fibrillation diagnosed earlier this year when he was undergoing preop assessment for some knee surgery.  The patient claims that he did not have any symptoms.  Following that diagnosis he wore a Holter monitor and had symptoms of lightheadedness when he was in atrial flutter with 2: 1 block and a ventricular response of 160 bpm.  The patient also notes that intermittently he has symptoms of feeling \"jittery\" which at times correlate with periods of rapid heart rate as determined by his wife.  Otherwise he is not experiencing any significant new symptoms.  He reports that he has been back to the sleep clinic and is resume CPAP therapy with the new machine.  He is not experiencing any exertional chest pain or pressure.  He reports no orthopnea, PND or ankle edema.    ECG: EKG from February 5, 2018 is reviewed.  The tracing shows normal sinus rhythm.  Personally reviewed.     ECHO: Echocardiogram dated February 9, 2018  Indications   Dx: Palpitation [R00.2 (ICD-10-CM)]   Summary     No previous study for comparison.    Patient in atrial fibrillation with rapid ventricular response during the early portions of the examination and appeared to convert to normal sinus rhythm during the mid portion of the examination.    Normal left ventricular size. Left ventricular systolic function appears lower limits of normal. Left ventricular ejection fraction estimated 50-55%.    Normal right ventricular size and systolic function.    No significant valve abnormality observed    Cannot exclude small PFO         Other Studies: Holter monitor dated February 9, 2018  Indications   Palpitation   Conclusion      HOLTER MONITOR      INTERPRETATION DATE:  02/14/2018      TEST DATE:  02/09/2019      INTERPRETATION:  Predominant rhythm is sinus rhythm but patient has frequent  episodes of paroxysmal atrial fibrillation and also atrial " flutter with rapid  ventricular response.  Average sinus rates ranged from approximately 54 beats per  minute up to approximately 120 beats per minute.  Patient has occasional atrial  premature beats and short runs of primary atrial tachycardia.  Paroxysmal atrial  fibrillation is relatively frequent with most runs 30 beats seconds to approximately  a minute in duration with average ventricular response during these times at  approximately 130 to 140 beats per minute.  At times atrial fibrillation organizes  into atrial flutter which was associated with lightheadedness from 3:35 p.m. to  approximately 4:30 p.m.  During these times average ventricular response is  approximately 160 beats per minute.  There were no clinically significant pauses.   Some aberrancy is noted with atrial premature beats.  Occasional ventricular  premature beats were present, 609 over 24 hours.  Symptoms of lightheadedness were  associated with atrial flutter and rapid ventricular response averaging  approximately 160 beats per minute in the early afternoon.     CONCLUSION:  Frequent episodes of paroxysmal atrial fibrillation of short duration  and atrial flutter with rapid ventricular response over for longer periods of time.   Average heart rate was approximately 160 beats per minute between approximately 2  and 4:15 p.m. associated with atrial flutter.            Physical Examination Review of Systems   Vitals:    07/24/18 0756   BP: 122/70   Pulse: (!) 56   Resp: 16     Body mass index is 26.96 kg/(m^2).  Wt Readings from Last 3 Encounters:   07/24/18 176 lb (79.8 kg)   06/19/18 179 lb (81.2 kg)   03/29/18 182 lb 6.4 oz (82.7 kg)     [unfilled]    General     Appearance:   The patient is alert oriented to person place and situation.    The patient is in no acute distress at the time of my evaluation.   HEENT:  Pupils are equal, round, and reactive to light.  Conjunctiva and sclera are clear.  ENT: Oral mucosa is moist and without  redness. No evident nasal discharge.   Neck: Without palpable thyroid or appreciable lymph nodes.   Chest: Symmetric, without deformity.   Lungs:   Clear bilaterally with no rales, rhonchi, or wheezes.     Cardiovascular:   Rhythm is regular. S1 and S2 are normal. No significant murmur is present. JVP is normal. Lower extremities demonstrate no significant edema. Distal pulses are intact bilaterally.   Abdomen:  Abdomen is flat, soft, and nontender.   Extremities: Without deformity.   Skin: Skin is warm, dry, and otherwise intact.   Neurologic: Gait is normal.           A 12 point comprehensive review of systems was  negative except as noted.      Medical History  Surgical History Family History Social History   Past Medical History:   Diagnosis Date   ? MAKAYLA (obstructive sleep apnea) 3/29/2018    Was previously on CPAP but not using   ? PAF (paroxysmal atrial fibrillation) (H) 3/29/2018    Dx Feb 2018 Mild symptoms, mild CM RVR + flutter [id number] score = 1 (HTN)   ? Typical atrial flutter (H) 3/29/2018    Symptomatic (Holter) Light headed with RVR (160 bpm)    Past Surgical History:   Procedure Laterality Date   ? NOSE SURGERY     ? TRANSURETHRAL RESECTION OF PROSTATE     ? VASECTOMY      Family History   Problem Relation Age of Onset   ? Pacemaker Mother    ? CABG Mother 90   ? No Medical Problems Father    ? No Medical Problems Sister    ? No Medical Problems Brother    ? No Medical Problems Sister    ? No Medical Problems Sister    ? Other Sister 16     Motorcycle accident   ? No Medical Problems Sister    ? No Medical Problems Brother    ? Other Brother      Twins with Esa   ? Other Brother     Social History     Social History   ? Marital status:      Spouse name: N/A   ? Number of children: N/A   ? Years of education: N/A     Occupational History   ? retired       Social History Main Topics   ? Smoking status: Never Smoker   ? Smokeless tobacco: Never Used   ? Alcohol use Not on file   ?  Drug use: Not on file   ? Sexual activity: Not on file     Other Topics Concern   ? Not on file     Social History Narrative          Medications  Allergies   Scheduled Meds:  Current Outpatient Prescriptions   Medication Sig Dispense Refill   ? ascorbic acid, vitamin C, (ASCORBIC ACID WITH BERNABE HIPS) 500 MG tablet Take 500 mg by mouth daily.     ? aspirin 81 MG EC tablet Take 81 mg by mouth daily.     ? calcium carbonate (OS-MAYLIN) 600 mg calcium (1,500 mg) tablet Take 600 mg by mouth daily.     ? cholecalciferol, vitamin D3, 1,000 unit tablet Take 1,000 Units by mouth daily.     ? mercaptopurine (PURINETHOL) 50 mg tablet Take 50 mg by mouth daily.     ? metoprolol succinate (TOPROL-XL) 50 MG 24 hr tablet Take 1 tablet (50 mg total) by mouth daily. 30 tablet 6   ? multivitamin therapeutic tablet Take 1 tablet by mouth daily.     ? oxybutynin (DITROPAN XL) 15 MG 24 hr tablet Take 15 mg by mouth daily.  3     No current facility-administered medications for this visit.     Allergies   Allergen Reactions   ? Sulfa (Sulfonamide Antibiotics) Swelling         Lab Results    Chemistry/lipid CBC Cardiac Enzymes/BNP/TSH/INR   Lab Results   Component Value Date    CHOL 136 02/13/2018    HDL 40 02/13/2018    LDLCALC 86 02/13/2018    TRIG 52 02/13/2018    CREATININE 1.04 02/05/2018    BUN 27 (H) 02/05/2018    K 4.7 02/05/2018     02/05/2018     02/05/2018    CO2 25 02/05/2018    Lab Results   Component Value Date    WBC 6.0 01/24/2017    HGB 15.2 01/24/2017    HCT 43.6 01/24/2017    MCV 93 01/24/2017     01/24/2017    Lab Results   Component Value Date    TSH 2.18 02/05/2018

## 2021-06-26 NOTE — PROGRESS NOTES
Progress Notes by Maday Gross CNP at 6/19/2018 10:30 AM     Author: Maday Gross CNP Service: -- Author Type: Nurse Practitioner    Filed: 6/19/2018 11:39 AM Encounter Date: 6/19/2018 Status: Signed    : Maday Gross CNP (Nurse Practitioner)          Click to link to [facility]     [facility] HEART [facility] ELECTROPHYSIOLOGY NOTE      Assessment/Recommendations   Assessment/Plan:    Diagnoses and all orders for this visit:    PAF (paroxysmal atrial fibrillation) (H) and Atrial flutter, unspecified type (H) and no symptoms if rate controlled with either arrhythmia.  He had frequent episodes on 24-hour Holter in February of this year, so I expect that he has continued to have episodes.  He has noticed no change in tolerance of activity or been limited in what he does.  He tells me he works hard in the yard and around the house for hours but certainly not as long as he was when he was working as an .  I gave him websites again where he can go to read more information about A. fib. He came to clinic with a list of questions which I addressed.  I again discussed rate versus rhythm control and how strategy for A. fib is decided on symptoms.  With no symptoms, he is not a candidate for antiarrhythmics or ablation.  [id number] score of 1 and this fall when he turns age 63 will be 2 and will recommend anticoagulation at that point.  I discussed anticoagulation has small risk of other bleeding especially with Crohn's disease.  I would recommend of the anticoagulants Eliquis as likely best option for him.  He is to discuss this with Dr. Genao when he sees him on follow-up in a few months.  He wants another opinion regarding management of his atrial fib so I recommended consult with Dr. Springer in 6-8 weeks regarding rate versus rhythm control.  I discussed Dr. Springer's experience with A. fib management.    MAKAYLA (obstructive sleep apnea) and if you were going to follow rhythm  control with atrial fib would encourage him strongly to use CPAP.  Otherwise I recommend that if he has clinical improvement with CPAP that he use it routinely.  He is to follow-up with Dr. Tracy as he has not had read out on CPAP machine to see if it is functioning appropriately for him.    Essential hypertension, benign and new diagnosis for him.  It appears under good control on metoprolol.    Other orders  -     aspirin 81 MG EC tablet; Take 81 mg by mouth daily.  -     oxybutynin (DITROPAN XL) 15 MG 24 hr tablet; Take 15 mg by mouth daily.; Refill: 3       History of Present Illness    Mr. Amilcar Penaloza is a very pleasant 62 y.o. male who comes in today for EP follow-up regarding paroxysmal atrial fibrillation and atrial flutter.  Amilcar Penaloza has a known history of paroxysmal atrial fibrillation, which was identified at the time of the preop history and physical prior to his knee surgery.  He has now had the knee surgery and fully recovered.  He is a retired .  He has Crohn's disease and follows with Dr. Amilcar Jaquez at [facility].  He passes occasional blood in the stool.  He has obstructive sleep apnea and tells me his apneic episodes on his back were as high as 60/h.  He had previously been using CPAP consistently but stopped using it after they moved.  He has now gotten a new mask and new machine and has been using it most of the time when he is at home.  He does not travel with it.  He tells me he has not felt better with CPAP but not using it consistently.  He denies any change in energy level or tolerance of activity.  He  denies any cardiac symptoms on questioning and does  not have any symptoms of A. fib or flutter on close questioning.  He tells me he is worried about having atrial fib.      Cardiographics (personally reviewed):  Results for orders placed during the hospital encounter of 02/09/18   Echo Complete [ECH10] 02/09/2018    Narrative   No previous study  for comparison.    Patient in atrial fibrillation with rapid ventricular response during   the early portions of the examination and appeared to convert to normal   sinus rhythm during the mid portion of the examination.    Normal left ventricular size. Left ventricular systolic function appears   lower limits of normal. Left ventricular ejection fraction estimated   50-55%.    Normal right ventricular size and systolic function.    No significant valve abnormality observed    Cannot exclude small PFO      February 2018 24-hour Holter shows:    CONCLUSION:  Frequent episodes of paroxysmal atrial fibrillation of short duration  and atrial flutter with rapid ventricular response over for longer periods of time.   Average heart rate was approximately 160 beats per minute between approximately 2  and 4:15 p.m. associated with atrial flutter.      Results for orders placed or performed in visit on 02/05/18   ECG Clinic - Today   Result Value Ref Range    SYSTOLIC BLOOD PRESSURE  mmHg    DIASTOLIC BLOOD PRESSURE  mmHg    VENTRICULAR RATE 71 BPM    ATRIAL RATE 71 BPM    P-R INTERVAL 164 ms    QRS DURATION 90 ms    Q-T INTERVAL 366 ms    QTC CALCULATION (BEZET) 397 ms    P Axis 66 degrees    R AXIS 49 degrees    T AXIS 33 degrees    MUSE DIAGNOSIS       Normal sinus rhythm  Normal ECG  No previous ECGs available  Confirmed by CHRISTOPHER FARRAR MD LOC: ([id number]) on 2/5/2018 3:33:24 PM            Problem List:  Patient Active Problem List   Diagnosis   ? Crohn's colitis (H)   ? PAF (paroxysmal atrial fibrillation) (H)   ? Atrial flutter (H)   ? MAKAYLA (obstructive sleep apnea)   ? Essential hypertension, benign       Physical Examination Review of Systems   Vitals:    06/19/18 1028   BP: 136/88   Pulse: 72   Resp: 16     Body mass index is 27.42 kg/(m^2).  Wt Readings from Last 3 Encounters:   06/19/18 179 lb (81.2 kg)   03/29/18 182 lb 6.4 oz (82.7 kg)   02/09/18 186 lb (84.4 kg)     General Appearance:   Alert, well-appearing  and in no acute distress.   HEENT: Atraumatic, normocephalic.  No scleral icterus, normal conjunctivae; mucous membranes pink and moist.     Chest: Chest symmetric, spine straight.   Lungs:   Respirations unlabored: Lungs are clear to auscultation.   Cardiovascular:   Normal first and second heart sounds with no murmurs, rubs, or gallops.  Regular, regular.  Radial and posterior tibial pulses are intact.  Normal JVD, no edema.       Extremities: No cyanosis or clubbing   Musculoskeletal: Moves all extremities   Skin: Warm, dry, intact.    Neurologic: Mood and affect are appropriate, alert and oriented to person, place, time, and situation    General: WNL  Eyes: WNL  Ears/Nose/Throat: WNL  Lungs: Cough  Heart: WNL  Stomach: WNL  Bladder: Frequent Urination at Night  Muscle/Joints: WNL  Skin: WNL  Nervous System: WNL  Mental Health: Anxiety     Blood: WNL       Medical History  Surgical History Family History Social History   No past medical history on file. Past Surgical History:   Procedure Laterality Date   ? NOSE SURGERY     ? TRANSURETHRAL RESECTION OF PROSTATE     ? VASECTOMY      No family history on file. Social History     Social History   ? Marital status:      Spouse name: N/A   ? Number of children: N/A   ? Years of education: N/A     Occupational History   ? retired       Social History Main Topics   ? Smoking status: Never Smoker   ? Smokeless tobacco: Never Used   ? Alcohol use Not on file   ? Drug use: Not on file   ? Sexual activity: Not on file     Other Topics Concern   ? Not on file     Social History Narrative          Medications  Allergies   Current Outpatient Prescriptions   Medication Sig Dispense Refill   ? ascorbic acid, vitamin C, (ASCORBIC ACID WITH BERNABE HIPS) 500 MG tablet Take 500 mg by mouth daily.     ? aspirin 81 MG EC tablet Take 81 mg by mouth daily.     ? calcium carbonate (OS-MAYLIN) 600 mg calcium (1,500 mg) tablet Take 600 mg by mouth daily.     ? cholecalciferol,  vitamin D3, 1,000 unit tablet Take 1,000 Units by mouth daily.     ? mercaptopurine (PURINETHOL) 50 mg tablet Take 50 mg by mouth daily.     ? metoprolol succinate (TOPROL-XL) 50 MG 24 hr tablet Take 1 tablet (50 mg total) by mouth daily. 30 tablet 6   ? multivitamin therapeutic tablet Take 1 tablet by mouth daily.     ? oxybutynin (DITROPAN XL) 15 MG 24 hr tablet Take 15 mg by mouth daily.  3     No current facility-administered medications for this visit.       Allergies   Allergen Reactions   ? Sulfa (Sulfonamide Antibiotics) Swelling      Medical, surgical, family, social history, and medications were all reviewed and updated as necessary.   Lab Results    Chemistry CBC/INR CHOLESTROL   Lab Results   Component Value Date    CREATININE 1.04 02/05/2018    BUN 27 (H) 02/05/2018     02/05/2018    K 4.7 02/05/2018     02/05/2018    CO2 25 02/05/2018     Creatinine (mg/dL)   Date Value   02/05/2018 1.04   01/24/2017 1.06   01/19/2016 0.97   09/22/2014 1.06     No results found for: BNP Lab Results   Component Value Date    WBC 6.0 01/24/2017    HGB 15.2 01/24/2017    HCT 43.6 01/24/2017    MCV 93 01/24/2017     01/24/2017     No results found for: INR   Lab Results   Component Value Date    CHOL 136 02/13/2018    HDL 40 02/13/2018    LDLCALC 86 02/13/2018    TRIG 52 02/13/2018          Greater than than 45 minutes were spent face to face in this visit discussing diagnoses as listed above, counseling, and coordination of care.    This note has been dictated using voice recognition software. Any grammatical, typographical, or context distortions are unintentional and inherent to the software.    Maday Gross RN,  [facility]   Electrophysiology  [phone]

## 2021-06-27 NOTE — PROGRESS NOTES
Progress Notes by Cyn June, RN at 1/7/2019 10:41 AM     Author: Cyn June RN Service: -- Author Type: Registered Nurse    Filed: 1/7/2019 10:41 AM Encounter Date: 1/7/2019 Status: Signed    : Cyn June RN (Registered Nurse)       Kahtrin June Caroline, KARON             done    Previous Messages      ----- Message -----   From: Cyn June RN   Sent: 12/28/2018   3:33 PM   To: Kathrin Pinon,   Could you please change the Ablation rep to Carto for this patients PVI scheduled for 1-23-19?   Thank you!!

## 2021-06-27 NOTE — PROGRESS NOTES
Progress Notes by Maday Gross CNP at 3/6/2019 10:30 AM     Author: Maday Gross CNP Service: -- Author Type: Nurse Practitioner    Filed: 3/6/2019 11:24 AM Encounter Date: 3/6/2019 Status: Signed    : Maday Gross CNP (Nurse Practitioner)          Click to link to [facility] Heart [facility] HEART [facility] ELECTROPHYSIOLOGY NOTE      Assessment/Recommendations   Assessment/Plan:    Diagnoses and all orders for this visit:    Paroxysmal atrial fibrillation (H) and post ablation with no known episodes of recurrence since.  No complications post ablation.  I discussed discussed details of the one patch monitor and to have this placed at least a month before appointment with Dr. Springer.  I discussed that he is on no activity restrictions other than would not encourage him to start playing hockey again as long as he is on anticoagulation.  -     One-Lead Patch Monitor; Future; Expected date: 03/07/2019    Typical atrial flutter (H) and treated at time of ablation  -     One-Lead Patch Monitor; Future; Expected date: 03/07/2019    Essential hypertension, benign and well controlled.    MAKAYLA (obstructive sleep apnea) discussed that he will need to be consistent on use of CPAP lifelong.  He is asking about going on Provigil for narcolepsy and okay with this if recommended.    [id number] score of 2 and on Eliquis and aspirin and to stop aspirin.  With recommend that he follow-up with Dr. Springer at 6-8 weeks as needs to discuss whether Dr. Springer would recommend long-term anticoagulation.  I discussed with Amilcar that his case is difficult and that guidelines recommend long-term anticoagulation but he is at increased risk for bleeding with Crohn's disease.  I do not think he fits the criteria for a watchman.       History of Present Illness    Mr. Amilcar Penaloza is a very pleasant 63 y.o. male who comes in today for EP follow-up after pulmonary vein isolation ablation on 1/23/2019.  Amilcar MELISSA  Tremaine has a known history of paroxysmal atrial fibrillation, which was found incidentally at the time of his preop H&P prior to knee surgery.  Amilcar is a retired  and has Crohn's disease, which has been stable for some years.  He has occasional blood in his stool.  He has severe obstructive sleep apnea and using CPAP.  He is using CPAP consistently every night now but tells me that he is having problems with early morning awakening.  He has discussed this with Dr. Maciel who is considering putting him on a sleeping pill.  He is going to see the sleep physician to have his CPAP checked and will discuss insomnia with him.  I agree with this plan.  He is already on melatonin nightly.  Amilcar complains of feeling faster heart rates once and had his wife listen to his heart and regular but a little bit faster than normal heart rate at the time she checked him.  Otherwise, he has had no symptoms of recurrence of A. fib but never had direct symptoms previously.  He is currently on Eliquis.  He came to clinic today with a list of questions.  On close questioning, he denies any complications related to ablation and no known recurrence of atrial arrhythmias.  Shortly after hospitalization with PVI he had a PSA checked and elevated.  Dr. Zheng wants to do a prostate biopsy.  His PSA was possibly elevated related to Casas catheter.  Encouraged him to have this rechecked with Dr. Zheng.  Discussed that he cannot come off of Eliquis for at least 3 months post ablation which would be April 23 or after without my review with Dr. Springer.      Cardiographics (personally reviewed):  Results for orders placed during the hospital encounter of 08/03/18   Echo Complete [ECH10] 08/03/2018    Narrative   Left Ventricle: Normal left ventricular size and systolic function.The   calculated left ventricular ejection fraction is 61%. This represents a   normal ejection fraction. The left ventricular wall thickness is normal.    E/e' < 8, suggesting normal LV filling pressure.Normal left atrial   pressure.    The left ventricular wall motion is normal.    Right Ventricle: Normal right ventricular size and systolic function.   TAPSE is normal, which is consistent with normal right ventricular   systolic function.     Compared to prior study performed on February 9, 2018, the left   ventricular ejection fraction is mildly improved on the current study.  On   the prior study, the patient appeared to start in atrial fibrillation and   then converted to sinus rhythm.  On the current study the patient appears   to be in sinus bradycardia and normal sinus rhythm for the entirety of the   study.       December 2018 CT of the pulmonary vein shows 4 pulmonary veins with left common os.  No extracardiac findings.    August 2018 24-hour Holter shows sinus rhythm with heart rate 40-1 2 0.  9 minutes of A. fib was longest but frequent episodes of paroxysmal atrial tachycardia, typical atrial flutter and A. fib.  Estimated burden was about 10%.  This is in comparison to Holter which showed 28% burden in February of 2018 and also having frequent short episodes of A. fib at that time.  This is first documentation of typical atrial flutter.    Results for orders placed or performed in visit on 01/17/19   ECG 12 lead with MUSE   Result Value Ref Range    SYSTOLIC BLOOD PRESSURE  mmHg    DIASTOLIC BLOOD PRESSURE  mmHg    VENTRICULAR RATE 68 BPM    ATRIAL RATE 68 BPM    P-R INTERVAL 166 ms    QRS DURATION 92 ms    Q-T INTERVAL 384 ms    QTC CALCULATION (BEZET) 408 ms    P Axis 57 degrees    R AXIS 36 degrees    T AXIS 29 degrees    MUSE DIAGNOSIS       Sinus rhythm  Possible Left atrial enlargement  Incomplete right bundle branch block  Borderline ECG  When compared with ECG of 05-FEB-2018 14:21,  No significant change was found  Confirmed by MARIANO MARTINEZ MD LOC:JN ([id number]) on 1/17/2019 3:35:19 PM            Problem List:  Patient Active Problem List    Diagnosis   ? Crohn's colitis (H)   ? Paroxysmal atrial fibrillation (H)   ? Typical atrial flutter (H)   ? MAKAYLA (obstructive sleep apnea)   ? Essential hypertension, benign   ? Status post catheter ablation of atrial fibrillation       Physical Examination Review of Systems   Vitals:    03/06/19 1024   BP: 122/80   Pulse: 76   Resp: 16     Body mass index is 28.98 kg/m .  Wt Readings from Last 3 Encounters:   03/06/19 185 lb (83.9 kg)   01/28/19 184 lb 11.2 oz (83.8 kg)   01/24/19 183 lb 1.6 oz (83.1 kg)     General Appearance:   Alert, well-appearing and in no acute distress.   HEENT: Atraumatic, normocephalic.  No scleral icterus, normal conjunctivae; mucous membranes pink and moist.     Chest: Chest symmetric, spine straight.   Lungs:   Respirations unlabored: Lungs are clear to auscultation.   Cardiovascular:   Normal first and second heart sounds with no murmurs, rubs, or gallops.  Regular, regular.  Radial and posterior tibial pulses are intact.  Normal JVD, no edema.       Extremities: No cyanosis or clubbing   Musculoskeletal: Moves all extremities   Skin: Warm, dry, intact.    Neurologic: Mood and affect are appropriate, alert and oriented to person, place, time, and situation    General: WNL  Eyes: WNL  Ears/Nose/Throat: WNL  Lungs: WNL  Heart: WNL  Stomach: WNL  Bladder: WNL  Muscle/Joints: WNL  Skin: WNL  Nervous System: WNL  Mental Health: WNL     Blood: WNL       Medical History  Surgical History Family History Social History   Past Medical History:   Diagnosis Date   ? MAKAYLA (obstructive sleep apnea) 3/29/2018    Was previously on CPAP but not using   ? PAF (paroxysmal atrial fibrillation) (H) 3/29/2018    Dx Feb 2018 Mild symptoms, mild CM RVR + flutter [id number] score = 1 (HTN)   ? Status post catheter ablation of atrial fibrillation 1/23/2019    PVI Jan 23, 2019 (cryo-PVI + RA-CTI line)   ? Typical atrial flutter (H) 3/29/2018    Symptomatic (Holter) Light headed with RVR (160 bpm)    Past  Surgical History:   Procedure Laterality Date   ? EP ABLATION AFLUTTER  1/23/2019    Procedure: EP Ablation Atrial Flutter;  Surgeon: Royer Springer MD;  Location: [facility];  Service: Cardiology   ? EP ABLATION PVI Left 1/23/2019    Procedure: EP Ablation PVI;  Surgeon: Royer Springer MD;  Location: [facility];  Service: Cardiology   ? KNEE ARTHROSCOPY Right     menicus repair   ? LASIK     ? NOSE SURGERY     ? TRANSURETHRAL RESECTION OF PROSTATE     ? VASECTOMY      Family History   Problem Relation Age of Onset   ? Pacemaker Mother    ? CABG Mother 90   ? No Medical Problems Father    ? No Medical Problems Sister    ? No Medical Problems Brother    ? No Medical Problems Sister    ? Sudden death Sister 58   ? Other Sister 16        Motorcycle accident   ? No Medical Problems Sister    ? No Medical Problems Brother    ? No Medical Problems Brother    ? No Medical Problems Brother     Social History     Socioeconomic History   ? Marital status:      Spouse name: Not on file   ? Number of children: Not on file   ? Years of education: Not on file   ? Highest education level: Not on file   Occupational History   ? Occupation: retired    Social Needs   ? Financial resource strain: Not on file   ? Food insecurity:     Worry: Not on file     Inability: Not on file   ? Transportation needs:     Medical: Not on file     Non-medical: Not on file   Tobacco Use   ? Smoking status: Never Smoker   ? Smokeless tobacco: Never Used   Substance and Sexual Activity   ? Alcohol use: Not on file   ? Drug use: Not on file   ? Sexual activity: Not on file   Lifestyle   ? Physical activity:     Days per week: Not on file     Minutes per session: Not on file   ? Stress: Not on file   Relationships   ? Social connections:     Talks on phone: Not on file     Gets together: Not on file     Attends Mandaen service: Not on file     Active member of club or organization: Not on file     Attends  meetings of clubs or organizations: Not on file     Relationship status: Not on file   ? Intimate partner violence:     Fear of current or ex partner: Not on file     Emotionally abused: Not on file     Physically abused: Not on file     Forced sexual activity: Not on file   Other Topics Concern   ? Not on file   Social History Narrative   ? Not on file          Medications  Allergies   Current Outpatient Medications   Medication Sig Dispense Refill   ? apixaban (ELIQUIS) 5 mg Tab tablet Take 1 tablet (5 mg total) by mouth 2 (two) times a day. Start 12/23/18 1 month prior to ablation 180 tablet 2   ? ascorbic acid, vitamin C, (ASCORBIC ACID WITH BERNABE HIPS) 500 MG tablet Take 500 mg by mouth daily.     ? calcium carbonate (OS-MAYLIN) 600 mg calcium (1,500 mg) tablet Take 600 mg by mouth daily.     ? cholecalciferol, vitamin D3, 1,000 unit tablet Take 1,000 Units by mouth daily.     ? mercaptopurine (PURINETHOL) 50 mg tablet Take 50 mg by mouth daily.     ? metoprolol succinate (TOPROL XL) 50 MG 24 hr tablet Take 1 tablet (50 mg total) by mouth daily. 90 tablet 1   ? multivitamin therapeutic tablet Take 1 tablet by mouth daily.       No current facility-administered medications for this visit.       Allergies   Allergen Reactions   ? Sulfa (Sulfonamide Antibiotics) Swelling      Medical, surgical, family, social history, and medications were all reviewed and updated as necessary.   Lab Results    Chemistry CBC/INR CHOLESTROL   Lab Results   Component Value Date    CREATININE 1.13 01/17/2019    BUN 21 01/17/2019     01/17/2019    K 4.5 01/17/2019     01/17/2019    CO2 30 01/17/2019     Creatinine (mg/dL)   Date Value   01/17/2019 1.13   02/05/2018 1.04   01/24/2017 1.06   01/19/2016 0.97     No results found for: BNP Lab Results   Component Value Date    WBC 6.8 01/17/2019    HGB 16.0 01/17/2019    HCT 47.0 01/17/2019    MCV 94 01/17/2019     01/17/2019     No results found for: INR   Lab Results    Component Value Date    CHOL 136 02/13/2018    HDL 40 02/13/2018    LDLCALC 86 02/13/2018    TRIG 52 02/13/2018          Total Time- 45 minutes with greater than 50% spent talking to patient and family regarding patient's relevant diagnoses.  This note has been dictated using voice recognition software. Any grammatical, typographical, or context distortions are unintentional and inherent to the software.    Maday Gross RN,  [facility]   Electrophysiology  [phone]

## 2021-06-27 NOTE — PROGRESS NOTES
Progress Notes by Maday Gross CNP at 1/17/2019  9:50 AM     Author: Maday Gross CNP Service: -- Author Type: Nurse Practitioner    Filed: 1/17/2019 10:46 AM Encounter Date: 1/17/2019 Status: Signed    : Maday Gross CNP (Nurse Practitioner)          Click to link to [facility] HEART [facility] ELECTROPHYSIOLOGY NOTE      Assessment/Recommendations   Assessment/Plan:    Diagnoses and all orders for this visit:    PAF (paroxysmal atrial fibrillation) (H) and Typical atrial flutter (H) and symptomatic.  Not on any antiarrhythmic.  Wants to avoid antiarrhythmics, so Amilcar wants to proceed with ablation and saw Dr. Springer in December.  Questions answered regarding ablation.  I discussed what to expect in the postoperative course and what to watch for and call us with.  See after visit summary for more details.  Amilcar is questioning why he needs JUSTICE.  He was not informed of this.  I do not see that he meets criteria for needing JUSTICE.  EP nurse will check with Dr. Springer regarding this.  I reviewed famotidine protocol and to start 3 days before ablation and to use for 1-3 weeks post ablation.  -     HM2(CBC w/o Differential)  -     Basic Metabolic Panel  -     ECG 12 lead with MUSE  -     famotidine (PEPCID) 20 MG tablet; Take 1 tablet (20 mg total) by mouth 2 (two) times a day. Start on January 20 and take for 1-3 weeks post ablation.; Refill: 0    MAKAYLA (obstructive sleep apnea) and using CPAP routinely.  To bring CPAP machine to the hospital the day of his ablation.    Essential hypertension, benign and well controlled.    [id number] score of 2 and on Eliquis for over 3 weeks with no missed doses.  To stop aspirin  Follow up in clinic with me in about 7 weeks.     History of Present Illness    Mr. mAilcar Penaloza is a very pleasant 63 y.o. male who comes in today for EP visit for history and physical prior to ablation which is planned for January 23.  Amilcar Penalzoa  has a known history of paroxysmal atrial fibrillation and typical atrial flutter and both have been documented on 24-hour Holter this last summer.  Amilcar had previously been having multiple episodes of paroxysmal episodes of A. fib but short in a day.  He has seen Dr. Tracy and is now using CPAP almost all the time.  He tells me his A. fib has been less over the last several months.  He does not always know when he is out of rhythm.  He has had no problems on Eliquis and okayed by his gastroenterologist.  He has been on Eliquis since December 23, 2018 and missed doses only in the first 2-3 days and none since then.  He has rare heartburn.  Outside of A. fib episodes, he denies any cardiac symptoms.  This visit will serve as history and physical for PVI.  See problem list for more details.  Medical, past medical, surgical and social history reviewed and updated.  Meds and allergies reviewed.   No personal or family history of adverse reactions to anesthesia or abnormal bleeding with surgery.        Cardiographics (personally reviewed):  Results for orders placed during the hospital encounter of 08/03/18   Echo Complete [ECH10] 08/03/2018    Narrative   Left Ventricle: Normal left ventricular size and systolic function.The   calculated left ventricular ejection fraction is 61%. This represents a   normal ejection fraction. The left ventricular wall thickness is normal.   E/e' < 8, suggesting normal LV filling pressure.Normal left atrial   pressure.    The left ventricular wall motion is normal.    Right Ventricle: Normal right ventricular size and systolic function.   TAPSE is normal, which is consistent with normal right ventricular   systolic function.     Compared to prior study performed on February 9, 2018, the left   ventricular ejection fraction is mildly improved on the current study.  On   the prior study, the patient appeared to start in atrial fibrillation and   then converted to sinus rhythm.  On the  current study the patient appears   to be in sinus bradycardia and normal sinus rhythm for the entirety of the   study.       December 2018 CT of pulmonary vein shows 4 pulmonary veins with left common os.  No extracardiac findings.  August 2018 24-hour Holter shows:  CONCLUSION:  Paroxysmal atrial fibrillation and atrial flutter noted, episodes were  short in duration with the longest episode 9 minutes of atrial fibrillation at 9:38  p.m.  Ventricular response rates generally in the range of 100 to 120 beats per  minute.  Frequent short runs of atrial tachycardia/atrial fibrillation were noted.   Generally, lasting less than a minute.  Atrial flutter with 2:1 conduction noted at  5:33 p.m.    Results for orders placed or performed in visit on 01/17/19   ECG 12 lead with MUSE   Result Value Ref Range    SYSTOLIC BLOOD PRESSURE  mmHg    DIASTOLIC BLOOD PRESSURE  mmHg    VENTRICULAR RATE 68 BPM    ATRIAL RATE 68 BPM    P-R INTERVAL 166 ms    QRS DURATION 92 ms    Q-T INTERVAL 384 ms    QTC CALCULATION (BEZET) 408 ms    P Axis 57 degrees    R AXIS 36 degrees    T AXIS 29 degrees    MUSE DIAGNOSIS       Normal sinus rhythm  Possible Left atrial enlargement  Incomplete right bundle branch block  Borderline ECG  When compared with ECG of 05-FEB-2018 14:21,  No significant change was found            Problem List:  Patient Active Problem List   Diagnosis   ? Crohn's colitis (H)   ? PAF (paroxysmal atrial fibrillation) (H)   ? Typical atrial flutter (H)   ? MAKAYLA (obstructive sleep apnea)   ? Essential hypertension, benign       Physical Examination Review of Systems   Vitals:    01/17/19 0952   BP: 102/82   Pulse: 60   Resp: 20     Body mass index is 28.4 kg/m .  Wt Readings from Last 3 Encounters:   01/17/19 185 lb 6.4 oz (84.1 kg)   12/20/18 183 lb (83 kg)   12/04/18 183 lb (83 kg)     General Appearance:   Alert, well-appearing and in no acute distress.   HEENT: Atraumatic, normocephalic.  No scleral icterus, normal  conjunctivae; mucous membranes pink and moist.     Chest: Chest symmetric, spine straight.   Lungs:   Respirations unlabored: Lungs are clear to auscultation.   Cardiovascular:   Normal first and second heart sounds with no murmurs, rubs, or gallops.  Regular, regular.  Radial and posterior tibial pulses are intact.  Normal JVD, no edema.       Extremities: No cyanosis or clubbing   Musculoskeletal: Moves all extremities   Skin: Warm, dry, intact.    Neurologic: Mood and affect are appropriate, alert and oriented to person, place, time, and situation    General: WNL  Eyes: WNL  Ears/Nose/Throat: WNL  Lungs: WNL  Heart: WNL  Stomach: WNL  Bladder: WNL  Muscle/Joints: WNL  Skin: WNL  Nervous System: WNL  Mental Health: WNL     Blood: WNL       Medical History  Surgical History Family History Social History   Past Medical History:   Diagnosis Date   ? MAKAYLA (obstructive sleep apnea) 3/29/2018    Was previously on CPAP but not using   ? PAF (paroxysmal atrial fibrillation) (H) 3/29/2018    Dx Feb 2018 Mild symptoms, mild CM RVR + flutter [id number] score = 1 (HTN)   ? Typical atrial flutter (H) 3/29/2018    Symptomatic (Holter) Light headed with RVR (160 bpm)    Past Surgical History:   Procedure Laterality Date   ? KNEE ARTHROSCOPY Right     menicus repair   ? LASIK     ? NOSE SURGERY     ? TRANSURETHRAL RESECTION OF PROSTATE     ? VASECTOMY      Family History   Problem Relation Age of Onset   ? Pacemaker Mother    ? CABG Mother 90   ? No Medical Problems Father    ? No Medical Problems Sister    ? No Medical Problems Brother    ? No Medical Problems Sister    ? Sudden death Sister 58   ? Other Sister 16        Motorcycle accident   ? No Medical Problems Sister    ? No Medical Problems Brother    ? No Medical Problems Brother    ? No Medical Problems Brother     Social History     Socioeconomic History   ? Marital status:      Spouse name: Not on file   ? Number of children: Not on file   ? Years of education:  Not on file   ? Highest education level: Not on file   Social Needs   ? Financial resource strain: Not on file   ? Food insecurity - worry: Not on file   ? Food insecurity - inability: Not on file   ? Transportation needs - medical: Not on file   ? Transportation needs - non-medical: Not on file   Occupational History   ? Occupation: retired    Tobacco Use   ? Smoking status: Never Smoker   ? Smokeless tobacco: Never Used   Substance and Sexual Activity   ? Alcohol use: Not on file   ? Drug use: Not on file   ? Sexual activity: Not on file   Other Topics Concern   ? Not on file   Social History Narrative   ? Not on file          Medications  Allergies   Current Outpatient Medications   Medication Sig Dispense Refill   ? apixaban (ELIQUIS) 5 mg Tab tablet Take 1 tablet (5 mg total) by mouth 2 (two) times a day. Start 12/23/18 1 month prior to ablation 180 tablet 2   ? ascorbic acid, vitamin C, (ASCORBIC ACID WITH BERNBAE HIPS) 500 MG tablet Take 500 mg by mouth daily.     ? calcium carbonate (OS-MAYLIN) 600 mg calcium (1,500 mg) tablet Take 600 mg by mouth daily.     ? cholecalciferol, vitamin D3, 1,000 unit tablet Take 1,000 Units by mouth daily.     ? mercaptopurine (PURINETHOL) 50 mg tablet Take 50 mg by mouth daily.     ? metoprolol succinate (TOPROL XL) 50 MG 24 hr tablet Take 1 tablet (50 mg total) by mouth daily. 90 tablet 1   ? multivitamin therapeutic tablet Take 1 tablet by mouth daily.     ? famotidine (PEPCID) 20 MG tablet Take 1 tablet (20 mg total) by mouth 2 (two) times a day. Start on January 20 and take for 1-3 weeks post ablation.  0   ? oxybutynin (DITROPAN XL) 15 MG 24 hr tablet Take 15 mg by mouth daily.  3     No current facility-administered medications for this visit.       Allergies   Allergen Reactions   ? Sulfa (Sulfonamide Antibiotics) Swelling      Medical, surgical, family, social history, and medications were all reviewed and updated as necessary.   Lab Results    Chemistry CBC/INR  CHOLESTROL   Lab Results   Component Value Date    CREATININE 1.04 02/05/2018    BUN 27 (H) 02/05/2018     02/05/2018    K 4.7 02/05/2018     02/05/2018    CO2 25 02/05/2018     Creatinine (mg/dL)   Date Value   02/05/2018 1.04   01/24/2017 1.06   01/19/2016 0.97   09/22/2014 1.06     No results found for: BNP Lab Results   Component Value Date    WBC 6.0 01/24/2017    HGB 15.2 01/24/2017    HCT 43.6 01/24/2017    MCV 93 01/24/2017     01/24/2017     No results found for: INR   Lab Results   Component Value Date    CHOL 136 02/13/2018    HDL 40 02/13/2018    LDLCALC 86 02/13/2018    TRIG 52 02/13/2018          Total Time- 45 minutes with greater than 50% spent talking to patient and family regarding patient's relevant diagnoses.  This note has been dictated using voice recognition software. Any grammatical, typographical, or context distortions are unintentional and inherent to the software.    Maday Gross RN,  [facility]   Electrophysiology  [phone]

## 2021-06-28 NOTE — PROGRESS NOTES
Progress Notes by William Young at 10/7/2019 12:30 PM     Author: William Young Service: -- Author Type: Patient Access    Filed: 10/29/2019  7:54 AM Encounter Date: 10/7/2019 Status: Addendum    : William Young (Patient Access)    Related Notes: Original Note by William Young (Patient Access) filed at 10/7/2019  3:18 PM             Zestar Study Consent Visit    Study description: ECG and PPG Study: Zestar Study      Note time seated: 1245     Amilcar Penaloza a 63 y.o. male , was seen in 2500 today to discuss participation in the Zestar study.   The consent discussion began on 10/03/2019.  Please refer to phone call note from Leatha Hankins for more details.  The consent form was reviewed with the patient.     The review of the study included:    Study purpose     Conflict of interest    Device description      Study visits    Risks of participation    Benefits (if any)    Alternatives    Voluntary participation    Confidentiality     Compensation/costs of participation    Study stipends    Injury and legal rights    The subject was provided time to review the consent form and consider participation. his questions were answered to his satisfaction.   The patient has voluntarily agreed to participate in the above noted study.     The consent form version [id number] and HIPAA form version [id number] was signed 10/07/19 at 1258    The subject was provided with a copy of the consent form and HIPPA. A copy of the signed forms was forwarded to medical records.    No study procedures were done prior to Amilcar Penaloza providing informed consent.     William Young    Subject Restrictions During Study -Confirmed with Subject prior to any study procedures completed    Restrictions on jewelry, recreational drugs, caffeine, and exercise few days prior and during study.   1. Subjects should not consume excessive amount of caffeine (6 or more 8-oz cups of coffee, or more than 570 mg of caffeine from energy drinks, pills or similar  "substance) during their participation in the study.   2. Subjects should not consume excessive amount of alcohol for the duration of their participation in the study. A typical moderate amount is allowed during stage 3.   3. Subjects should not take any recreational drugs (including, but not limited to methamphetamines, cocaine, opioids, cannabis, LSD) for the duration of their participation in the study.   4. Subjects should not wear underwire bra or jewelry during the in-lab study (to not interfere with electrode placement and ECG data recordings).   5. Subject will not be permitted to have their cell phone or any electronic recording device on or with them during the in-lab test session(s).   6. Subjects under 22 years old will not be permitted to take ECG recordings through the ECG fer on the wrist-worn devices.     For study stage 3 only   1. Subjects should only do high intensity exercise (e.g. sprinting, heavy lifting, etc.) in the morning upon awakening or else not at all   2. Subjects should abstain from swimming during the time of the study   3. Subjects should only shower in the morning upon awakening (or else not at all)   4. Female subjects are strongly suggested to wear non-underwire bras throughout this stage of the study     William Young      Study Data collections   Vitals  (TPBP)     Vitals:    10/07/19 1301 10/07/19 1303 10/07/19 1304   BP: 125/81 124/80 121/78   Patient Site: Right Arm Right Arm Right Arm   Patient Position: Sitting Sitting Sitting   Cuff Size: Adult Regular Adult Regular Adult Regular   Pulse: 94 91 92   Resp:  12    Temp: 97.9  F (36.6  C)     Weight:   186 lb (84.4 kg)   Height:   5' 7.32\" (1.71 m)      VS taken after 5 min rest     MAP 1    90  MAP 2      95   MAP 3             83          Body mass index is 28.85 kg/m .  male  1955  63 y.o.      Note time patient placed in supine position: 1307    Ethnicity   []   or    [x]  Not  or "   Race   []   or    []    []  Black or   []   or Other   [x]  White  Physical Activity Level  per subject report:   []  0- Extremely Inactive []  1- Sedentary [x]  2- Moderately Active  []  3- Vigorously Active []  4- Extremely Active  Trained Athlete   [] Yes  [x] No     De [place] Rosa's' Skin type   [] Type 1 [] Type 2 [x] Type 3    [] Type 4 [] Type 5 [] Type 6    Subject participated in previous ECG study at [facility]: [] Yes    [x] No    Past Medical History:   Diagnosis Date   ? Acute Crohn's disease (H) 2001   ? Colitis 2001   ? CPAP (continuous positive airway pressure) dependence 2011   ? Hypertension 2019   ? MAKAYLA (obstructive sleep apnea) 3/29/2018    Was previously on CPAP but not using   ? PAF (paroxysmal atrial fibrillation) (H) 3/29/2018    Dx Feb 2018 Mild symptoms, mild CM RVR + flutter [id number] score = 1 (HTN)   ? Status post catheter ablation of atrial fibrillation 1/23/2019    PVI Jan 23, 2019 (cryo-PVI + RA-CTI line)   ? Typical atrial flutter (H) 3/29/2018    Symptomatic (Holter) Light headed with RVR (160 bpm)       HISTORY OF HEART RHYTHM ABNORMALITIES   []  None   [x]  Atrial Flutter  [] Frequent PACs (>3 in 30 secs)  [] AF (permanent)  []  Tachycardia  [] Bigeminy, trigeminy, and/or quadgeminy  []  AF (persistent)  []  Heart Block   [x]  AF (paroxysmal)  [] PVCs    [] AF (other)    [] BBB    [x]  Other: History of AF (paroxysmal)   How many years? 1  Special interest allergies: active allergic skin reactions  Allergies   Allergen Reactions   ? Sulfa (Sulfonamide Antibiotics) Swelling       Current Outpatient Medications:   ?  ascorbic acid, vitamin C, (ASCORBIC ACID WITH BERNABE HIPS) 500 MG tablet, Take 500 mg by mouth daily., Disp: , Rfl:   ?  calcium carbonate (OS-MAYLIN) 600 mg calcium (1,500 mg) tablet, Take 600 mg by mouth daily., Disp: , Rfl:   ?  cholecalciferol, vitamin D3, 1,000 unit tablet, Take  "1,000 Units by mouth daily., Disp: , Rfl:   ?  ELIQUIS 5 mg Tab tablet, Take 1 tablet (5 mg total) by mouth 2 (two) times a day. Start 12/23/18 1 month prior to ablation, Disp: 180 tablet, Rfl: 2  ?  losartan (COZAAR) 25 MG tablet, Take 1 tablet (25 mg) by mouth once daily, Disp: 90 tablet, Rfl: 2  ?  mercaptopurine (PURINETHOL) 50 mg tablet, Take 50 mg by mouth daily., Disp: , Rfl:   ?  multivitamin therapeutic tablet, Take 1 tablet by mouth daily., Disp: , Rfl:       10-sec 12-lead ECG & 30-sec 12-lead ECG rhythm strip done; reviewed by & PE done by Rock Harris  Subject Questionnaire    OCCUPATION: Retired   Predominately works outdoors  [] Yes    [x] No      Hours/week spent outdoors (total, not only for work): 14    Frequently participates in \"hand intensive\" activities [] Yes [x] No  Caffeine  []  Never  [] Occasionally        [x]  Daily (1 cup/day)     []  Daily (>1 cup/day)    Alcohol   []  Never  [] Light (drink or 2 occasionally) [x]  Moderate (a drink or 2 almost daily)   []  Occasional-heavy (more than a few drinks <2x / month)  [] Heavy (more than a few drinks >2x / month)    Tobacco/nicotine  [x]  Never  [] Rarely  []  Frequently/ Daily     Mattress Information  [] Subject did not participate in Stage 3  Mattress type:  [x]  Memory foam [] Gel  [] Innerspring (coil)  [] Airbed  [] Waterbed [] Shikibuton  [] Hybrid  [] No mattress  [] Other (comment):    Mattress foundation   [] Mattress on floor/ground    [] Mattress on foundation/box spring on floor/ground  [x] Mattress on foundation/box spring on bed frame  [] Mattress on tatami on floor/ground  [] Other (comment):    Mattress topper   [x] No mattress topper  [] Pillow top  [] Foam top - flat style  [] Foam top - \"egg crate\" style  [] Other (comment):    Co-sleeper    [x]  Yes  []  No    CPAP use   [x] Yes  [] No      Dominant hand [] left  [x] right [] ambidextrous  Preferred Wrist to wear band on   [x]  left   []  right   Were screening day & " study day: [x]  same [] different   Same: wrist circumference:      172   mm  Device wearing wrist skin fold thickness:       5.4  mm  Wrist Band Size:     []  Flush Fit S/M  []  Non-Flush Fit S/M   [x]  Flush Fit M/L  []  Non-Flush Fit M/L  []  Flush Fit XL  []  Non-Flush Fit XL    Preferred/natural band notch: 3  Secure band notch: 3  Crown orientation:  []  left   [x]  right  Device wearing wrist hairiness:     []  Light [x]  Medium       []  Heavy  Spectophotometer    L  A  B   Reading #1  56.95  13.94  20.00   Reading #2  57.44  12.46  18.50   Reading #3  57.17  13.61  20.22     Pregnancy test  for WOCBP    [x] n/a male or female not child bearing potential  Room Temperature ( C): 21  CS Laptop ID: [id number]  CS Cam ID:[id number]  Device Set ID:[id number]  Wrist Device ID:[id number]  Subject has now completed their in-house participation in the Zestar study. Subject will complete Stage 3 at home for the next 3 days and return the equipment on 10/10/2019.  William Young

## 2021-06-28 NOTE — PROGRESS NOTES
Progress Notes by William Young at 10/7/2019 12:30 PM     Author: William Young Service: -- Author Type: Patient Access    Filed: 10/26/2019  7:21 PM Encounter Date: 10/7/2019 Status: Signed    : Royer Springer MD (Physician)    Related Notes: Original Note by William Young (Patient Access) filed at 10/7/2019  3:18 PM           [facility] Study Inclusion / Exclusion Criteria  Protocol Version 1.0    Inclusion Criteria    Yes No Criteria # Subject must meet all inclusion criteria:      [x]    []  1 At least 18 years old for NSR and 22 years old for Non-NSR. Inclusive for both cohorts, at time of screening, with no upper limit on age.        [x]      []  2 To be enrolled as a non-NSR subject the volunteer must have one of the following conditions: Permanent/Persistent AF, Hx of paroxysmal AF, Hx of High-rate AF, AF + rate control medication, Hx Atrial Flutter, PVC burden >1%, Frequent PACs, Hx BBB, Hx 2nd degree block (any type), Hx Bigeminy/Trigeminy/Quadgeminy, Hx Tachycardia. For subjects with any of the following diagnoses, the condition must be present at the time of screening:   ? Permanent/persistent AF  ? AF plus rate control medication  ? PVC [place]  ? Frequent PACs   Note: If not present at screening, subjects may not be enrolled as a non-NSR subject or NSR subject.         3  [x] N/A HE site For Subjects to be enrolled as NSR they must be in NSR at the time of screening as determined by the investigator. For subjects with occasional ectopic beats (<1% burden during screening), they should still qualify as individuals in the NSR cohort as long as they don't have a medical history/diagnosis of significant ectopic burden.    [x]  []  4 Able to read and understand a written ICF    [x]  []  5 Willing and able to participate in the study procedures and comply with its restrictions    [x]  []  6 Able to communicate effectively with study staff as well as understand and follow directions    All must be  Yes    Exclusion Criteria-all must be no  Yes No Criteria # Subject must not meet any exclusion criteria:    []  [x]  1 Physical disability that prevents safe and adequate testing.   []  [x]  2 Pregnant women or women planning to become pregnant   []  [x]  3 Any acute illness or condition that may interfere with study procedures (e.g. cough, fever, sore throat, headache, sunburn, etc.)   []  [x]  4 Clinically significant hand tremors, as judged by the Investigator   []  [x]  5 Resting hypertension with systolic blood pressure ?161 mmHg or diastolic blood pressure ?101 mmHg (if at least 2 of 3 measurements meet this criteria)   []  [x]  6 Subjects with implanted cardiac devices such as a pacemaker or an automated implantable cardioverter- defibrillator (AICD)   []  [x]  7 Acute myocardial infarction (MI) within 90 days from the screening visit   []  [x]  8 Other cardiovascular disease that increases the risk to the subject or would render the data uninterpretable in the opinion of the Investigator (e.g., recent or ongoing unstable angina, significant valvular heart disease or chronic heart failure, myocarditis or pericarditis)    []  [x]  9 Acute pulmonary embolism, pulmonary infarction, or deep vein thrombosis within 90 days from the screening visit    []  [x]  10 Stroke or transient ischemic attack within 90 days from the screening visit    []  [x]  11 Known untreated medical conditions as determined by the Investigator, such as but not limited to significant anemia, important electrolyte imbalance and untreated or uncontrolled thyroid disease.    []  [x]  12 Any history of wrist surgery with scarring in the area of the sensor location on the wrist where the subject will be wearing the watch;    []  [x]  13 Open wound(s) on the wrist and forearm where the subject will be wearing the watch    []  [x]  14 Severe symptomatic (or active) overly dry/injured skin, skin disorders, or allergic skin reactions such as  eczema, rosacea, impetigo, dermatomyositis or allergic contact dermatitis on wrist and locations where the electrodes will be placed (e.g. chest, forearms, stomach), as determined by the investigator.    []  [x]  15 Tattoos, scars or moles in the area of the sensor location on the wrist where the subject will be wearing the watch    []  [x]  16 Device wearing Wrist circumference ? 129 mm or ? 246 mm    []  [x]  17 Known significant sensitivity to medical adhesives or isopropyl alcohol (for ECG electrode placement)    []  [x]  18 Known allergy or sensitivity to fluorocarbon-based synthetic rubber, such as contact dermatitis with fluoroelastomer bands primarily used in wrist worn fitness devices    []  [x]  19 Subjects with any Medical History, Physical exam, vital sign or any other study procedure finding/assessment that in the opinion of the investigator could compromise subject safety during study participation or interfere with the study integrity and/or the accurate assessment of the study objectives    []  [x]  20 Subject works for a company that develops or sells medical and/or fitness devices (e.g., ECG monitors, wearable fitness bands, sleep monitors, etc.) or are technology journalists (e.g., professional bloggers, TV, magazine, newspaper reporters, etc.)    []  [x]  21 Weight > 181 kg for subjects using the stationary bike and/or treadmill. Weight of ?138 kg for NSR subjects.    []  [x]  22 Subject is employed in shift work, or otherwise does not maintain a reasonably consistent day/night schedule (e.g. Subjects who go to bed after 4am).    []  [x]  23 Overnight travel planned during data collection nights    []  [x]  24 Non-NSR subjects should not have partaken in strenuous physical activity within 12 hours prior to screening    []  [x]  25 Non-NSR subjects with Atrial fibrillation categories: Subjects taking Class 1 or Class 3 antiarrhythmic agents such as the following may not take part in any stage of  the study: amiodarone, sotalol, dronedarone, ibutilide, dofetilide, propafenone, quinidine, procainamide, disopyramide, flecainide (Subjects taking class 2, 4 or 5 antiarrhythmic agents may take part the study).      Patient meets inclusion criteria.  Patient has no exclusion criteria.    William Young

## 2021-06-28 NOTE — PROGRESS NOTES
"Progress Notes by Royer Springer MD at 3/31/2020 12:50 PM     Author: Royer Springer MD Service: -- Author Type: Physician    Filed: 3/31/2020  2:02 PM Encounter Date: 3/31/2020 Status: Signed    : Royer Springer MD (Physician)       The patient has been notified of following:     \"This telephone visit will be conducted via a call between you and your physician/provider. We have found that certain health care needs can be provided without the need for a physical exam.  This service lets us provide the care you need with a phone conversation.  If a prescription is necessary we can send it directly to your pharmacy.  If lab work is needed we can place an order for that and you can then stop by our lab to have the test done at a later time. If during the course of the call the physician/provider feels a telephone visit is not appropriate, you will not be charged for this service.\"         HEART CARE PHONE ENCOUNTER        Appointment is being conducted as a telephone visit, to reduce risk of exposure given the current status of Coronavirus outbreak in our community. This telephone visit is being conducted via a call between the physician/provider and the patient. Health care needs are being provided without a physical exam.     Assessment/Recommendations   Assessment:         Paroxysmal atrial: The patient is just over a year out from his ablation procedure to treat his atrial fibrillation.  He reports no symptoms to suggest recurrence of atrial fibrillation or flutter.  He remains off membrane active antiarrhythmic drug therapy.  He has a [id number] score of 2 and currently remains on oral anticoagulant therapy (Eliquis).  The patient has chronic Crohn's colitis and is on medical therapy for that condition.  He is at increased risk for GI bleeding and his gastroenterologist has wondered about him coming off oral anticoagulant therapy.  I do think that Amilcar may be a candidate for appealing to undergo a " "left atrial appendage closure given his underlying medical condition.  I have suggested that we wait until he is seen again by his gastroenterologist and undergoes follow-up colonoscopy to determine whether or not we would move forward with an appeal.    Obstructive sleep apnea: The patient is compliant with his CPAP therapy.      Recommendations:    No change in current medical therapy this includes ongoing use of Eliquis.    As noted above we will consider left atrial appendage closure with referral to Sindy LEON in 4 months to further discuss whether or not we should consider appealing to him his insurance company for consideration of device placement.    Follow-up in the atrial fibrillation clinic with the EP nurse practitioner in 12 months    I have reviewed the note as documented.  This accurately captures the substance of my conversation with the patient.    Total time of call between patient and provider was 15 minutes        History of Present Illness/Subjective    Amilcar Penaloza is a 64 y.o. male who is being evaluated via a billable telephone visit.    The patient reports no symptoms of palpitations or any other awareness of his heartbeat.  He is moderately active having stopped his routine exercise due to the coronavirus pandemic.  The patient does note that when he hunted last fall and when he did yard work over the past couple of weeks that he \"tires easily\".  He does not report any exertional chest pain or pressure.  He reports no significant dyspnea just that he feels more tired out.  He does note that his previously surgically repaired knee is his primary limiting factor.  The patient does note that his gastroenterologist Dr. Genao had wondered about possibly coming off oral anticoagulant therapy due to his chronic colitis.  He reports relatively mild recent symptoms and is due for follow-up with his gastroenterologist in the not too distant future.  He reports no other change in his " general medical condition.    I have reviewed and updated the patient's Past Medical History, Social History, Family History and Medication List.     Physical Examination not perform given phone encounter Review of Systems                                                Medical History  Surgical History Family History Social History   Past Medical History:   Diagnosis Date   ? Acute Crohn's disease (H) 2001   ? Colitis 2001   ? CPAP (continuous positive airway pressure) dependence 2011   ? Hypertension 2019   ? MAKAYLA (obstructive sleep apnea) 3/29/2018    Was previously on CPAP but not using   ? PAF (paroxysmal atrial fibrillation) (H) 3/29/2018    Dx Feb 2018 Mild symptoms, mild CM RVR + flutter [id number] score = 1 (HTN)   ? Status post catheter ablation of atrial fibrillation 1/23/2019    PVI Jan 23, 2019 (cryo-PVI + RA-CTI line)   ? Typical atrial flutter (H) 3/29/2018    Symptomatic (Holter) Light headed with RVR (160 bpm)    Past Surgical History:   Procedure Laterality Date   ? EP ABLATION AFLUTTER  1/23/2019    Procedure: EP Ablation Atrial Flutter;  Surgeon: Royer Springer MD;  Location: [facility];  Service: Cardiology   ? EP ABLATION PVI Left 1/23/2019    Procedure: EP Ablation PVI;  Surgeon: Royer Springer MD;  Location: [facility];  Service: Cardiology   ? KNEE ARTHROSCOPY Right 2018    menicus repair   ? LASIK  2012   ? NOSE SURGERY  2013   ? TRANSURETHRAL RESECTION OF PROSTATE  2015   ? VASECTOMY  20 years ago    Family History   Problem Relation Age of Onset   ? Pacemaker Mother    ? CABG Mother 90   ? No Medical Problems Father    ? No Medical Problems Sister    ? No Medical Problems Brother    ? No Medical Problems Sister    ? Sudden death Sister 58   ? Other Sister 16        Motorcycle accident   ? No Medical Problems Sister    ? No Medical Problems Brother    ? No Medical Problems Brother    ? No Medical Problems Brother     Social History     Socioeconomic History   ?  Marital status:      Spouse name: Not on file   ? Number of children: Not on file   ? Years of education: Not on file   ? Highest education level: Not on file   Occupational History   ? Occupation: retired    Social Needs   ? Financial resource strain: Not on file   ? Food insecurity     Worry: Not on file     Inability: Not on file   ? Transportation needs     Medical: Not on file     Non-medical: Not on file   Tobacco Use   ? Smoking status: Never Smoker   ? Smokeless tobacco: Never Used   Substance and Sexual Activity   ? Alcohol use: Not on file   ? Drug use: Not on file   ? Sexual activity: Not on file   Lifestyle   ? Physical activity     Days per week: Not on file     Minutes per session: Not on file   ? Stress: Not on file   Relationships   ? Social connections     Talks on phone: Not on file     Gets together: Not on file     Attends [employer] service: Not on file     Active member of club or organization: Not on file     Attends meetings of clubs or organizations: Not on file     Relationship status: Not on file   ? Intimate partner violence     Fear of current or ex partner: Not on file     Emotionally abused: Not on file     Physically abused: Not on file     Forced sexual activity: Not on file   Other Topics Concern   ? Not on file   Social History Narrative   ? Not on file          Medications  Allergies   Current Outpatient Medications   Medication Sig Dispense Refill   ? ascorbic acid, vitamin C, (ASCORBIC ACID WITH BERNABE HIPS) 500 MG tablet Take 500 mg by mouth daily.     ? calcium carbonate (OS-MAYLIN) 600 mg calcium (1,500 mg) tablet Take 600 mg by mouth daily.     ? cholecalciferol, vitamin D3, 1,000 unit tablet Take 1,000 Units by mouth daily.     ? ELIQUIS 5 mg Tab tablet Take 1 tablet (5 mg total) by mouth 2 (two) times a day. Start 12/23/18 1 month prior to ablation 180 tablet 2   ? losartan (COZAAR) 25 MG tablet Take 1 tablet (25 mg) by mouth once daily 90 tablet 2   ?  mercaptopurine (PURINETHOL) 50 mg tablet Take 50 mg by mouth daily.     ? multivitamin therapeutic tablet Take 1 tablet by mouth daily.       No current facility-administered medications for this visit.     Allergies   Allergen Reactions   ? Sulfa (Sulfonamide Antibiotics) Swelling         Lab Results    Chemistry/lipid CBC Cardiac Enzymes/BNP/TSH/INR   Lab Results   Component Value Date    CHOL 125 06/06/2019    HDL 39 (L) 06/06/2019    LDLCALC 79 06/06/2019    TRIG 34 06/06/2019    CREATININE 1.00 06/06/2019    BUN 26 (H) 06/06/2019    K 4.3 06/06/2019     06/06/2019     06/06/2019    CO2 29 06/06/2019    Lab Results   Component Value Date    WBC 6.6 06/06/2019    HGB 14.1 06/06/2019    HCT 42.2 06/06/2019    MCV 95 06/06/2019     06/06/2019    Lab Results   Component Value Date    TSH 2.18 02/05/2018        Royer Springer

## 2021-06-28 NOTE — PROGRESS NOTES
Progress Notes by Rock Harris at 10/7/2019 12:30 PM     Author: Rock Harris Service: -- Author Type: --    Filed: 10/7/2019  3:18 PM Encounter Date: 10/7/2019 Status: Signed    : Rock Harris Study    Physical Examination  For abnormal findings, please evaluate if the finding is Clinically Significant (by 'CS') or Not Clinically Significant (by 'NCS')  General Appearance Normal  Head and Neck  Normal  Lungs    Normal  Cardiovascular  Normal  Abdomen   Normal  Musculoskeletal/Extremities Normal   Lymph Nodes  Normal  Skin    Normal  Neurological   Normal   Tremor absent     If present, evaluate severity on 1-10 scale    Rock Harris, CNP, APRN

## 2021-06-28 NOTE — PROGRESS NOTES
Progress Notes by Yvette Carney at 10/10/2019  9:43 AM     Author: Yvette Carney Service: -- Author Type: Research Associate    Filed: 10/10/2019  9:44 AM Encounter Date: 10/10/2019 Status: Signed    : Yvette Carney (Research Associate)         [employer] Study Device Return    Amilcar Penaloza returned all the devices for the [employer] study.  Amilcar Penaloza denies medication changes or adverse events since last visit.    Amilcar Penaloza has now completed their participation in the [employer] study.   Thank you for your gift of participation.    Yvette Carney

## 2021-06-28 NOTE — PROGRESS NOTES
Progress Notes by Maday Gross CNP at 9/24/2019 10:30 AM     Author: Maday Gross CNP Service: -- Author Type: Nurse Practitioner    Filed: 9/24/2019 11:35 AM Encounter Date: 9/24/2019 Status: Signed    : Maday Gross CNP (Nurse Practitioner)          Click to link to [facility]     [facility] HEART CARE ELECTROPHYSIOLOGY NOTE      Assessment/Recommendations   Assessment/Plan:    Diagnoses and all orders for this visit:    Paroxysmal atrial fibrillation (H) and Typical atrial flutter (H) with both arrhythmias treated at time of ablation in January.  He is now 9 months post PVI with no symptoms or EKG evidence of reocurrence.  To stop metoprolol.    Essential hypertension, benign and well within goal.  To continue losartan.    MAKAYLA (obstructive sleep apnea) and using CPAP all of the time when he is home but does not travel with it.    [id number] score of 2 and on Eliquis without problems.  I discussed with Amilcar that he is not qualified for the watchman device.  Questions answered regarding this device as Dr. Springer discussed this briefly at last visit.  Follow up in clinic with Dr. Springer or myself in 6 months.     History of Present Illness    Mr. Amilcar Penaloza is a very pleasant 63 y.o. male who comes in today for EP follow-up for paroxysmal atrial fibrillation.  Amilcar Penaloza has a known history of paroxysmal atrial fibrillation, which was found incidentally at the time of his preop H&P prior to knee surgery.  Amilcar is a retired  and has Crohn's disease, which has been stable for some years.  He has occasional blood in his stool.  He has severe obstructive sleep apnea.  On January 23, 2019 Amilcar had pulmonary vein isolation ablation with Dr. Springer and had cryoablation of the pulmonary veins and right CTI flutter ablation.  He has had no symptoms or EKG evidence of recurrence of A. fib since.  Amilcar was not directly symptomatic with A. fib and he and his  wife are telling me today that he never had symptoms with A. fib.  He complains of tiring more easily and slight lightheadedness when bending over.  He denies any other cardiac symptoms.      Cardiographics (personally reviewed):  Results for orders placed during the hospital encounter of 08/03/18   Echo Complete [ECH10] 08/03/2018    Narrative   Left Ventricle: Normal left ventricular size and systolic function.The   calculated left ventricular ejection fraction is 61%. This represents a   normal ejection fraction. The left ventricular wall thickness is normal.   E/e' < 8, suggesting normal LV filling pressure.Normal left atrial   pressure.    The left ventricular wall motion is normal.    Right Ventricle: Normal right ventricular size and systolic function.   TAPSE is normal, which is consistent with normal right ventricular   systolic function.     Compared to prior study performed on February 9, 2018, the left   ventricular ejection fraction is mildly improved on the current study.  On   the prior study, the patient appeared to start in atrial fibrillation and   then converted to sinus rhythm.  On the current study the patient appears   to be in sinus bradycardia and normal sinus rhythm for the entirety of the   study.       March 2019 2 weeks symptom monitor was negative for any atrial fib.  No pauses greater than 3 seconds.  In sinus rhythm with heart rate .  December 2018 CT of the pulmonary vein shows 4 pulmonary veins with left common os.  No extracardiac findings.     August 2018 24-hour Holter shows sinus rhythm with heart rate 40-1 2 0.  9 minutes of A. fib was longest but frequent episodes of paroxysmal atrial tachycardia, typical atrial flutter and A. fib.  Estimated burden was about 10%.  This is in comparison to Holter which showed 28% burden in February of 2018 and also having frequent short episodes of A. fib at that time.  This is first documentation of typical atrial flutter.    Results for  orders placed or performed in visit on 01/17/19   ECG 12 lead with MUSE   Result Value Ref Range    SYSTOLIC BLOOD PRESSURE  mmHg    DIASTOLIC BLOOD PRESSURE  mmHg    VENTRICULAR RATE 68 BPM    ATRIAL RATE 68 BPM    P-R INTERVAL 166 ms    QRS DURATION 92 ms    Q-T INTERVAL 384 ms    QTC CALCULATION (BEZET) 408 ms    P Axis 57 degrees    R AXIS 36 degrees    T AXIS 29 degrees    MUSE DIAGNOSIS       Sinus rhythm  Possible Left atrial enlargement  Incomplete right bundle branch block  Borderline ECG  When compared with ECG of 05-FEB-2018 14:21,  No significant change was found  Confirmed by MARIANO MARTINEZ MD LOC:[facility]Z ([id number]) on 1/17/2019 3:35:19 PM            Problem List:  Patient Active Problem List   Diagnosis   ? Crohn's colitis (H)   ? Paroxysmal atrial fibrillation (H)   ? Typical atrial flutter (H)   ? MAKAYLA (obstructive sleep apnea)   ? Essential hypertension, benign   ? Status post catheter ablation of atrial fibrillation       Physical Examination Review of Systems   Vitals:    09/24/19 1020   BP: 118/80   Pulse: 81   Resp: 16     Body mass index is 28.98 kg/m .  Wt Readings from Last 3 Encounters:   09/24/19 185 lb (83.9 kg)   04/10/19 181 lb (82.1 kg)   03/06/19 185 lb (83.9 kg)     General Appearance:   Alert, well-appearing and in no acute distress.   HEENT: Atraumatic, normocephalic.  No scleral icterus, normal conjunctivae; mucous membranes pink and moist.     Chest: Chest symmetric, spine straight.   Lungs:   Respirations unlabored: Lungs are clear to auscultation.   Cardiovascular:   Normal first and second heart sounds with no murmurs, rubs, or gallops.  Regular, regular.  Radial and posterior tibial pulses are intact.  Normal JVD, no edema.       Extremities: No cyanosis or clubbing   Musculoskeletal: Moves all extremities   Skin: Warm, dry, intact.    Neurologic: Mood and affect are appropriate, alert and oriented to person, place, time, and situation    General: WNL  Eyes: WNL  Ears/Nose/Throat:  WNL  Lungs: WNL  Heart: WNL  Stomach: WNL  Bladder: WNL  Muscle/Joints: WNL  Skin: WNL     Mental Health: WNL     Blood: WNL       Medical History  Surgical History Family History Social History   Past Medical History:   Diagnosis Date   ? MAKAYLA (obstructive sleep apnea) 3/29/2018    Was previously on CPAP but not using   ? PAF (paroxysmal atrial fibrillation) (H) 3/29/2018    Dx Feb 2018 Mild symptoms, mild CM RVR + flutter [id number] score = 1 (HTN)   ? Status post catheter ablation of atrial fibrillation 1/23/2019    PVI Jan 23, 2019 (cryo-PVI + RA-CTI line)   ? Typical atrial flutter (H) 3/29/2018    Symptomatic (Holter) Light headed with RVR (160 bpm)    Past Surgical History:   Procedure Laterality Date   ? EP ABLATION AFLUTTER  1/23/2019    Procedure: EP Ablation Atrial Flutter;  Surgeon: Royer Springer MD;  Location: [facility] Cath Lab;  Service: Cardiology   ? EP ABLATION PVI Left 1/23/2019    Procedure: EP Ablation PVI;  Surgeon: Royer Springer MD;  Location: [facility] Cath Lab;  Service: Cardiology   ? KNEE ARTHROSCOPY Right     menicus repair   ? LASIK     ? NOSE SURGERY     ? TRANSURETHRAL RESECTION OF PROSTATE     ? VASECTOMY      Family History   Problem Relation Age of Onset   ? Pacemaker Mother    ? CABG Mother 90   ? No Medical Problems Father    ? No Medical Problems Sister    ? No Medical Problems Brother    ? No Medical Problems Sister    ? Sudden death Sister 58   ? Other Sister 16        Motorcycle accident   ? No Medical Problems Sister    ? No Medical Problems Brother    ? No Medical Problems Brother    ? No Medical Problems Brother     Social History     Tobacco Use   Smoking Status Never Smoker   Smokeless Tobacco Never Used     Social History     Substance and Sexual Activity   Alcohol Use Not on file          Medications  Allergies   Current Outpatient Medications   Medication Sig Dispense Refill   ? ascorbic acid, vitamin C, (ASCORBIC ACID WITH BERNABE HIPS) 500 MG tablet Take 500  mg by mouth daily.     ? calcium carbonate (OS-MAYLIN) 600 mg calcium (1,500 mg) tablet Take 600 mg by mouth daily.     ? cholecalciferol, vitamin D3, 1,000 unit tablet Take 1,000 Units by mouth daily.     ? ELIQUIS 5 mg Tab tablet Take 1 tablet (5 mg total) by mouth 2 (two) times a day. Start 12/23/18 1 month prior to ablation 180 tablet 2   ? losartan (COZAAR) 25 MG tablet Take 1 tablet (25 mg) by mouth once daily 90 tablet 2   ? mercaptopurine (PURINETHOL) 50 mg tablet Take 50 mg by mouth daily.     ? multivitamin therapeutic tablet Take 1 tablet by mouth daily.       No current facility-administered medications for this visit.       Allergies   Allergen Reactions   ? Sulfa (Sulfonamide Antibiotics) Swelling      Medical, surgical, family, social history, and medications were all reviewed and updated as necessary.   Lab Results    Chemistry CBC/INR CHOLESTROL   Lab Results   Component Value Date    CREATININE 1.00 06/06/2019    BUN 26 (H) 06/06/2019     06/06/2019    K 4.3 06/06/2019     06/06/2019    CO2 29 06/06/2019     Creatinine (mg/dL)   Date Value   06/06/2019 1.00   01/17/2019 1.13   02/05/2018 1.04   01/24/2017 1.06     No results found for: BNP Lab Results   Component Value Date    WBC 6.6 06/06/2019    HGB 14.1 06/06/2019    HCT 42.2 06/06/2019    MCV 95 06/06/2019     06/06/2019     No results found for: INR   Lab Results   Component Value Date    CHOL 125 06/06/2019    HDL 39 (L) 06/06/2019    LDLCALC 79 06/06/2019    TRIG 34 06/06/2019          Maday Gross RN,  [facility]   Electrophysiology  [phone]

## 2021-06-30 NOTE — PROGRESS NOTES
Progress Notes by Maday Gross CNP at 2/4/2021 10:30 AM     Author: Maday Gross CNP Service: -- Author Type: Nurse Practitioner    Filed: 2/4/2021 11:25 AM Encounter Date: 2/4/2021 Status: Signed    : Maday Gross CNP (Nurse Practitioner)            Thank you, Dr. Maciel, for asking the [facility] team to see . Amilcar Penaloza to evaluate atrial arrhythmias.    Assessment/Recommendations     Assessment/Plan:    Diagnoses and all orders for this visit:    Paroxysmal atrial fibrillation (H) and Typical atrial flutter (H) and no symptoms of recurrence of atrial arrhythmias and now 2 years post PVI.  Note was not symptomatic previously with atrial arrhythmias.    Essential hypertension, benign and blood pressure well below goal.  No hypotensive symptoms with aggressive exercise such as hunting and hiking in the woods.  Discussed and okay with staying on losartan.    MAKAYLA (obstructive sleep apnea) and using CPAP routinely.    Other orders  -     multivit-min/FA/lycopen/lutein (CENTRUM SILVER MEN ORAL); 1 tab(s)  -     melatonin 3 mg cap; 1 tab(s)  -     losartan (COZAAR) 25 MG tablet; 1 tab(s)    [id number] score of 2 and on Eliquis and without any bleeding problems.  Not interested in watchman at this point which he does not qualify for and could only get thru appeal of his insurance company.  Follow up in clinic with Dr. Springer in 1 year.     History of Present Illness/Subjective     Amilcar Penaloza is a very pleasant 65 y.o. male who comes in today for EP follow-up for atrial arrhythmias.  Amilcar Penaloza has a known history of paroxysmal atrial fibrillation, which was found incidentally at the time of his preop H&P prior to knee surgery.  Amilcar is a retired  and has Crohn's disease, which has been stable for some years.  He has occasional blood in his stool.  He has severe obstructive sleep apnea.  On January 23, 2019 Amilcar had pulmonary vein  isolation ablation with Dr. Springer and had cryoablation of the pulmonary veins and right CTI flutter ablation.  He has had no symptoms or EKG evidence of recurrence of A. fib since.  Amilcar was not directly symptomatic with A. fib and reminds me today that he never had symptoms.    Amilcar is no longer playing hockey but he is biking and has now put his bike on a stand and is exercising on this every day.  He denies any cardiac symptoms on questioning.    Cardiographics (reviewed):  Results for orders placed during the hospital encounter of 08/03/18   Echo Complete [ECH10] 08/03/2018    Narrative   Left Ventricle: Normal left ventricular size and systolic function.The   calculated left ventricular ejection fraction is 61%. This represents a   normal ejection fraction. The left ventricular wall thickness is normal.   E/e' < 8, suggesting normal LV filling pressure.Normal left atrial   pressure.    The left ventricular wall motion is normal.    Right Ventricle: Normal right ventricular size and systolic function.   TAPSE is normal, which is consistent with normal right ventricular   systolic function.     Compared to prior study performed on February 9, 2018, the left   ventricular ejection fraction is mildly improved on the current study.  On   the prior study, the patient appeared to start in atrial fibrillation and   then converted to sinus rhythm.  On the current study the patient appears   to be in sinus bradycardia and normal sinus rhythm for the entirety of the   study.           Cardiac testing personally reviewed:  March 2019 2 weeks symptom monitor was negative for any atrial fib.  No pauses greater than 3 seconds.  In sinus rhythm with heart rate .  Results for orders placed or performed in visit on 10/07/19   ECG Clinic - Today   Result Value Ref Range    SYSTOLIC BLOOD PRESSURE      DIASTOLIC BLOOD PRESSURE      VENTRICULAR RATE 83 BPM    ATRIAL RATE 83 BPM    P-R INTERVAL 164 ms    QRS DURATION 86 ms     Q-T INTERVAL 348 ms    QTC CALCULATION (BEZET) 408 ms    P Axis 69 degrees    R AXIS 50 degrees    T AXIS 40 degrees    MUSE DIAGNOSIS       Normal sinus rhythm  Normal ECG  When compared with ECG of 17-JAN-2019 10:29,  No significant change was found  Confirmed by AMRITA BAL MD LOC:[facility] ([id number]) on 10/7/2019 3:03:37 PM            Problem List:  Patient Active Problem List   Diagnosis     Crohn's colitis (H)     Paroxysmal atrial fibrillation (H)     Typical atrial flutter (H)     MAKAYLA (obstructive sleep apnea)     Essential hypertension, benign     Status post catheter ablation of atrial fibrillation     Revi  e  Physical Examination Review of Systems   w [facility]  Vitals:    02/04/21 1024   BP: 106/66   Pulse: 73   Resp: 16   SpO2: 94%     Body mass index is 28.25 kg/m .  Wt Readings from Last 3 Encounters:   02/04/21 182 lb (82.6 kg)   10/07/19 186 lb (84.4 kg)   09/24/19 185 lb (83.9 kg)     General Appearance:   Alert, well-appearing and in no acute distress.   HEENT: Atraumatic, normocephalic.  No scleral icterus, normal conjunctivae; mucous membranes pink and moist.     Chest: Chest symmetric, spine straight.   Lungs:   Respirations unlabored: Lungs are clear to auscultation.   Cardiovascular:   Normal first and second heart sounds with no murmurs, rubs, or gallops.  Regular, regular.  Radial and posterior tibial pulses are intact.  Normal JVD, no edema.       Extremities: No cyanosis or clubbing   Musculoskeletal: Moves all extremities   Skin: Warm, dry, intact.    Neurologic: Mood and affect are appropriate, alert and oriented to person, place, time, and situation    General: WNL  Eyes: WNL  Ears/Nose/Throat: WNL  Lungs: WNL  Heart: WNL  Stomach: WNL  Bladder: WNL  Muscle/Joints: WNL  Skin: WNL  Nervous System: WNL  Mental Health: WNL     Blood: WNL       Medical History  Surgical History Family History Social History     Past Medical History:   Diagnosis Date     Acute Crohn's disease (H) 2001     Colitis  2001     CPAP (continuous positive airway pressure) dependence 2011     Hypertension 2019     MAKAYLA (obstructive sleep apnea) 3/29/2018    Was previously on CPAP but not using     PAF (paroxysmal atrial fibrillation) (H) 3/29/2018    Dx Feb 2018 Mild symptoms, mild CM RVR + flutter [id number] score = 1 (HTN)     Status post catheter ablation of atrial fibrillation 1/23/2019    PVI Jan 23, 2019 (cryo-PVI + RA-CTI line)     Typical atrial flutter (H) 3/29/2018    Symptomatic (Holter) Light headed with RVR (160 bpm)    Past Surgical History:   Procedure Laterality Date     EP ABLATION AFLUTTER  1/23/2019    Procedure: EP Ablation Atrial Flutter;  Surgeon: Royer Springer MD;  Location: [facility] Lab;  Service: Cardiology     EP ABLATION PVI Left 1/23/2019    Procedure: EP Ablation PVI;  Surgeon: Royer Springer MD;  Location: [facility] Lab;  Service: Cardiology     KNEE ARTHROSCOPY Right 2018    menicus repair     LASIK  2012     NOSE SURGERY  2013     TRANSURETHRAL RESECTION OF PROSTATE  2015     VASECTOMY  20 years ago    Family History   Problem Relation Age of Onset     Pacemaker Mother      CABG Mother 90     No Medical Problems Father      No Medical Problems Sister      No Medical Problems Brother      No Medical Problems Sister      Sudden death Sister 58     Other Sister 16        Motorcycle accident     No Medical Problems Sister      No Medical Problems Brother      No Medical Problems Brother      No Medical Problems Brother     Social History     Tobacco Use   Smoking Status Never Smoker   Smokeless Tobacco Never Used     Social History     Substance and Sexual Activity   Alcohol Use None        Medications  Allergies     Current Outpatient Medications   Medication Sig Dispense Refill     apixaban ANTICOAGULANT (ELIQUIS) 5 mg Tab tablet Take 1 tablet (5 mg total) by mouth 2 (two) times a day. 180 tablet 3     ascorbic acid, vitamin C, (ASCORBIC ACID WITH BERNABE HIPS) 500 MG tablet Take 500 mg  by mouth daily.       calcium carbonate (OS-MAYLIN) 600 mg calcium (1,500 mg) tablet Take 600 mg by mouth daily.       cholecalciferol, vitamin D3, 1,000 unit tablet Take 1,000 Units by mouth daily.       losartan (COZAAR) 25 MG tablet 1 tab(s)       melatonin 3 mg cap 1 tab(s)       mercaptopurine (PURINETHOL) 50 mg tablet Take 50 mg by mouth daily.       multivit-min/FA/lycopen/lutein (CENTRUM SILVER MEN ORAL) 1 tab(s)       No current facility-administered medications for this visit.       Allergies   Allergen Reactions     Sulfa (Sulfonamide Antibiotics) Swelling      Medical, surgical, family, social history, and medications were all reviewed and updated as necessary.   Lab Results    Chemistry/lipid CBC Cardiac Enzymes/BNP/TSH/INR     Lab Results   Component Value Date    CREATININE 1.09 08/18/2020    BUN 21 08/18/2020     08/18/2020    K 4.3 08/18/2020     08/18/2020    CO2 26 08/18/2020     Creatinine (mg/dL)   Date Value   08/18/2020 1.09   06/06/2019 1.00   01/17/2019 1.13   02/05/2018 1.04     No results found for: BNP Lab Results   Component Value Date    WBC 6.2 08/18/2020    HGB 14.3 08/18/2020    HCT 41.5 08/18/2020    MCV 94 08/18/2020     08/18/2020     No results found for: INR   Lab Results   Component Value Date    CHOL 138 08/18/2020    HDL 37 (L) 08/18/2020    LDLCALC 70 08/18/2020    TRIG 155 (H) 08/18/2020            This note has been dictated using voice recognition software. Any grammatical, typographical, or context distortions are unintentional and inherent to the software.

## 2021-08-25 ENCOUNTER — LAB REQUISITION (OUTPATIENT)
Dept: LAB | Facility: CLINIC | Age: 66
End: 2021-08-25
Payer: COMMERCIAL

## 2021-08-25 DIAGNOSIS — Z13.220 ENCOUNTER FOR SCREENING FOR LIPOID DISORDERS: ICD-10-CM

## 2021-08-25 DIAGNOSIS — I10 ESSENTIAL (PRIMARY) HYPERTENSION: ICD-10-CM

## 2021-08-25 LAB
ANION GAP SERPL CALCULATED.3IONS-SCNC: 6 MMOL/L (ref 5–18)
BUN SERPL-MCNC: 22 MG/DL (ref 8–22)
CALCIUM SERPL-MCNC: 10 MG/DL (ref 8.5–10.5)
CHLORIDE BLD-SCNC: 101 MMOL/L (ref 98–107)
CHOLEST SERPL-MCNC: 138 MG/DL
CO2 SERPL-SCNC: 32 MMOL/L (ref 22–31)
CREAT SERPL-MCNC: 1.23 MG/DL (ref 0.7–1.3)
GFR SERPL CREATININE-BSD FRML MDRD: 60 ML/MIN/1.73M2
GLUCOSE BLD-MCNC: 96 MG/DL (ref 70–125)
HDLC SERPL-MCNC: 42 MG/DL
LDLC SERPL CALC-MCNC: 82 MG/DL
POTASSIUM BLD-SCNC: 4.2 MMOL/L (ref 3.5–5)
SODIUM SERPL-SCNC: 139 MMOL/L (ref 136–145)
TRIGL SERPL-MCNC: 68 MG/DL

## 2021-08-25 PROCEDURE — 36415 COLL VENOUS BLD VENIPUNCTURE: CPT | Mod: ORL | Performed by: FAMILY MEDICINE

## 2021-08-25 PROCEDURE — 80061 LIPID PANEL: CPT | Mod: ORL | Performed by: FAMILY MEDICINE

## 2021-08-25 PROCEDURE — 80048 BASIC METABOLIC PNL TOTAL CA: CPT | Mod: ORL | Performed by: FAMILY MEDICINE

## 2021-09-19 ENCOUNTER — HEALTH MAINTENANCE LETTER (OUTPATIENT)
Age: 66
End: 2021-09-19

## 2022-03-06 ENCOUNTER — HEALTH MAINTENANCE LETTER (OUTPATIENT)
Age: 67
End: 2022-03-06

## 2022-04-08 ENCOUNTER — OFFICE VISIT (OUTPATIENT)
Dept: CARDIOLOGY | Facility: CLINIC | Age: 67
End: 2022-04-08
Payer: COMMERCIAL

## 2022-04-08 VITALS
BODY MASS INDEX: 27.32 KG/M2 | SYSTOLIC BLOOD PRESSURE: 124 MMHG | WEIGHT: 176 LBS | DIASTOLIC BLOOD PRESSURE: 80 MMHG | RESPIRATION RATE: 16 BRPM | HEART RATE: 80 BPM

## 2022-04-08 DIAGNOSIS — Z98.890 STATUS POST CATHETER ABLATION OF ATRIAL FIBRILLATION: ICD-10-CM

## 2022-04-08 DIAGNOSIS — I48.0 PAROXYSMAL ATRIAL FIBRILLATION (H): Primary | ICD-10-CM

## 2022-04-08 DIAGNOSIS — G47.33 OSA (OBSTRUCTIVE SLEEP APNEA): ICD-10-CM

## 2022-04-08 PROCEDURE — 99214 OFFICE O/P EST MOD 30 MIN: CPT | Performed by: INTERNAL MEDICINE

## 2022-04-08 RX ORDER — OXYBUTYNIN CHLORIDE 15 MG/1
15 TABLET, EXTENDED RELEASE ORAL DAILY
COMMUNITY
Start: 2021-10-05 | End: 2024-04-26

## 2022-04-08 NOTE — PROGRESS NOTES
[facility] [place]  Cardiac Electrophysiology     Progress Note: Royer Springer MD    Primary Care: Esa Maher    Primary Cardiologist: See below    Primary Electrophysiologist: Royer Springer MD    Assessment:         Paroxysmal atrial fibrillation: The patient is 3 years out from his ablation procedure to treat his atrial fibrillation.  The patient currently reports no symptomatic recurrence of atrial fibrillation or flutter.  He remains off membrane active antiarrhythmic drug therapy and has had no ECG documented recurrence of atrial fibrillation or flutter.  His [id number] score is 2 and he remains on oral anticoagulant therapy.  He reports no issues on his current regimen.    Obstructive sleep apnea: Chronic problem and the patient reports that he is compliant with his CPAP therapy    Primary hypertension: The patient reports occasional elevations of his systolic blood pressure when he is at doctor's offices but he does not check his blood pressure regularly.  He is asking about whether he continues to need ongoing therapy (losartan).  I suspect that he does have underlying essential hypertension at this point and will require therapy, but I suggested that he discuss this further with his primary care physician Dr. Jamshid Zhu.    Recommendations:    Continue current medical therapy.    I encouraged the patient to follow-up with Dr. Zhu and discuss his blood pressure assessment, follow-up and management.    Follow-up in atrial fibrillation clinic with the EP nurse practitioner in 1 year.  I would anticipate that the patient would then be assigned a general cardiologist semiannual follow-up.    Time spent: 35 minutes spent on the date of the encounter doing chart review, history and exam, documentation and further activities as noted above.    Subjective:  Amilcar Penaloza (66 year old male) returns to the arrhythmia clinic for interval reevaluation of his atrial fibrillation post  ablation.  The patient is 3 years out from his ablation procedure to treat his atrial fibrillation.  The patient continues to do well with no reported symptoms suggesting return of atrial fibrillation or flutter.  He continues to be active and does not experience any exertional dyspnea or chest discomfort.  He is not having any orthopnea, PND or ankle edema.  He underwent surgery on his right great toe for a ingrown toenail.  He otherwise has been in good health.    Current Outpatient Medications   Medication Sig Dispense Refill     ascorbic acid, vitamin C, (ASCORBIC ACID WITH BERNABE HIPS) 500 MG tablet [ASCORBIC ACID, VITAMIN C, (ASCORBIC ACID WITH BERNABE HIPS) 500 MG TABLET] Take 500 mg by mouth daily.       calcium carbonate (OS-MAYLIN) 600 mg calcium (1,500 mg) tablet [CALCIUM CARBONATE (OS-MAYLIN) 600 MG CALCIUM (1,500 MG) TABLET] Take 600 mg by mouth daily.       cholecalciferol, vitamin D3, 1,000 unit tablet [CHOLECALCIFEROL, VITAMIN D3, 1,000 UNIT TABLET] Take 1,000 Units by mouth daily.       ELIQUIS ANTICOAGULANT 5 MG tablet TAKE 1 TABLET BY MOUTH TWICE A  tablet 1     losartan (COZAAR) 25 MG tablet TAKE 1 TABLET BY MOUTH EVERY DAY 90 tablet 0     melatonin 3 mg cap [MELATONIN 3 MG CAP] 1 tab(s)       mercaptopurine (PURINETHOL) 50 mg tablet [MERCAPTOPURINE (PURINETHOL) 50 MG TABLET] Take 50 mg by mouth daily.       multivit-min/FA/lycopen/lutein (CENTRUM SILVER MEN ORAL) [MULTIVIT-MIN/FA/LYCOPEN/LUTEIN (CENTRUM SILVER MEN ORAL)] 1 tab(s)         Review of Systems:                                            Family History  Family History   Problem Relation Age of Onset     Pacemaker Mother      CABG Mother 90.00     No Known Problems Father      No Known Problems Sister      No Known Problems Brother      No Known Problems Sister      Sudden Death Sister 58.00     Other - See Comments Sister 16.00        Motorcycle accident     No Known Problems Sister      No Known Problems Brother      No Known Problems  Brother      No Known Problems Brother        Social History   reports that he has never smoked. He has never used smokeless tobacco.    Objective:   Vital signs in last 24 hours:  There were no vitals taken for this visit.  Weight: @FLOWAMB(14)@     Physical Exam:  General: The patient is alert oriented to person place and situation.  The patient is in no acute distress at the time of my evaluation.  Eyes: Pupils are equal, round, and reactive to light.  Conjunctiva and sclera are clear.  ENT: Oral mucosa is moist and without redness. No evident nasal discharge.  Pulmonary: Lungs are clear bilaterally with no rales, rhonchi, or wheezes.    Cardiovascular exam: Rhythm is regular. S1 and S2 are normal. No significant murmur is present. JVP is normal. Lower extremities demonstrate no significant edema. Distal pulses are intact bilaterally.  Abdomen is soft, nontender, no organomegaly, and bowel sounds present.  Musculoskeletal: Spine is straight. Extremities without deformity.  Neuro: Gait is normal.   Skin is warm, dry, and otherwise intact.      Cardiographics:       Lab Results:   Lab Results   Component Value Date     08/25/2021    CO2 32 08/25/2021    BUN 22 08/25/2021     Lab Results   Component Value Date    WBC 6.2 08/18/2020    HGB 14.3 08/18/2020    HCT 41.5 08/18/2020    MCV 94 08/18/2020     08/18/2020     No results found for: INR      Outside record review:

## 2022-04-08 NOTE — LETTER
4/8/2022    Jamshid Zhu MD  [facility] [address]  [facility] [address]    RE: Amilcar Penaloza       Dear Colleague,     I had the pleasure of seeing Amilcar Penaloza in the [facility].    [facility]  Cardiac Electrophysiology     Progress Note: Royer Springer MD    Primary Care: Esa Maher    Primary Cardiologist: See below    Primary Electrophysiologist: Royer Springer MD    Assessment:         Paroxysmal atrial fibrillation: The patient is 3 years out from his ablation procedure to treat his atrial fibrillation.  The patient currently reports no symptomatic recurrence of atrial fibrillation or flutter.  He remains off membrane active antiarrhythmic drug therapy and has had no ECG documented recurrence of atrial fibrillation or flutter.  His [id number]-CGKn score is 2 and he remains on oral anticoagulant therapy.  He reports no issues on his current regimen.    Obstructive sleep apnea: Chronic problem and the patient reports that he is compliant with his CPAP therapy    Primary hypertension: The patient reports occasional elevations of his systolic blood pressure when he is at doctor's offices but he does not check his blood pressure regularly.  He is asking about whether he continues to need ongoing therapy (losartan).  I suspect that he does have underlying essential hypertension at this point and will require therapy, but I suggested that he discuss this further with his primary care physician Dr. Jamshid Zhu.    Recommendations:    Continue current medical therapy.    I encouraged the patient to follow-up with Dr. Zhu and discuss his blood pressure assessment, follow-up and management.    Follow-up in atrial fibrillation clinic with the EP nurse practitioner in 1 year.  I would anticipate that the patient would then be assigned a general cardiologist semiannual follow-up.    Time spent: 35 minutes spent on the date of the encounter  doing chart review, history and exam, documentation and further activities as noted above.    Subjective:  Amilcar Penaloza (66 year old male) returns to the arrhythmia clinic for interval reevaluation of his atrial fibrillation post ablation.  The patient is 3 years out from his ablation procedure to treat his atrial fibrillation.  The patient continues to do well with no reported symptoms suggesting return of atrial fibrillation or flutter.  He continues to be active and does not experience any exertional dyspnea or chest discomfort.  He is not having any orthopnea, PND or ankle edema.  He underwent surgery on his right great toe for a ingrown toenail.  He otherwise has been in good health.    Current Outpatient Medications   Medication Sig Dispense Refill     ascorbic acid, vitamin C, (ASCORBIC ACID WITH BERNABE HIPS) 500 MG tablet [ASCORBIC ACID, VITAMIN C, (ASCORBIC ACID WITH BERNABE HIPS) 500 MG TABLET] Take 500 mg by mouth daily.       calcium carbonate (OS-MAYLIN) 600 mg calcium (1,500 mg) tablet [CALCIUM CARBONATE (OS-MAYLIN) 600 MG CALCIUM (1,500 MG) TABLET] Take 600 mg by mouth daily.       cholecalciferol, vitamin D3, 1,000 unit tablet [CHOLECALCIFEROL, VITAMIN D3, 1,000 UNIT TABLET] Take 1,000 Units by mouth daily.       ELIQUIS ANTICOAGULANT 5 MG tablet TAKE 1 TABLET BY MOUTH TWICE A  tablet 1     losartan (COZAAR) 25 MG tablet TAKE 1 TABLET BY MOUTH EVERY DAY 90 tablet 0     melatonin 3 mg cap [MELATONIN 3 MG CAP] 1 tab(s)       mercaptopurine (PURINETHOL) 50 mg tablet [MERCAPTOPURINE (PURINETHOL) 50 MG TABLET] Take 50 mg by mouth daily.       multivit-min/FA/lycopen/lutein (CENTRUM SILVER MEN ORAL) [MULTIVIT-MIN/FA/LYCOPEN/LUTEIN (CENTRUM SILVER MEN ORAL)] 1 tab(s)         Review of Systems:                                            Family History  Family History   Problem Relation Age of Onset     Pacemaker Mother      CABG Mother 90.00     No Known Problems Father      No Known Problems Sister      No  Known Problems Brother      No Known Problems Sister      Sudden Death Sister 58.00     Other - See Comments Sister 16.00        Motorcycle accident     No Known Problems Sister      No Known Problems Brother      No Known Problems Brother      No Known Problems Brother        Social History   reports that he has never smoked. He has never used smokeless tobacco.    Objective:   Vital signs in last 24 hours:  There were no vitals taken for this visit.  Weight: @FLOWAMB(14)@     Physical Exam:  General: The patient is alert oriented to person place and situation.  The patient is in no acute distress at the time of my evaluation.  Eyes: Pupils are equal, round, and reactive to light.  Conjunctiva and sclera are clear.  ENT: Oral mucosa is moist and without redness. No evident nasal discharge.  Pulmonary: Lungs are clear bilaterally with no rales, rhonchi, or wheezes.    Cardiovascular exam: Rhythm is regular. S1 and S2 are normal. No significant murmur is present. JVP is normal. Lower extremities demonstrate no significant edema. Distal pulses are intact bilaterally.  Abdomen is soft, nontender, no organomegaly, and bowel sounds present.  Musculoskeletal: Spine is straight. Extremities without deformity.  Neuro: Gait is normal.   Skin is warm, dry, and otherwise intact.      Cardiographics:       Lab Results:   Lab Results   Component Value Date     08/25/2021    CO2 32 08/25/2021    BUN 22 08/25/2021     Lab Results   Component Value Date    WBC 6.2 08/18/2020    HGB 14.3 08/18/2020    HCT 41.5 08/18/2020    MCV 94 08/18/2020     08/18/2020     No results found for: INR      Outside record review:    Thank you for allowing me to participate in the care of your patient.      Sincerely,     Royer Springer MD     [facility]  cc:   No referring provider defined for this encounter.

## 2022-06-26 ENCOUNTER — HEALTH MAINTENANCE LETTER (OUTPATIENT)
Age: 67
End: 2022-06-26

## 2022-08-26 ENCOUNTER — LAB REQUISITION (OUTPATIENT)
Dept: LAB | Facility: CLINIC | Age: 67
End: 2022-08-26
Payer: COMMERCIAL

## 2022-08-26 ENCOUNTER — TRANSFERRED RECORDS (OUTPATIENT)
Dept: HEALTH INFORMATION MANAGEMENT | Facility: CLINIC | Age: 67
End: 2022-08-26

## 2022-08-26 DIAGNOSIS — Z12.5 ENCOUNTER FOR SCREENING FOR MALIGNANT NEOPLASM OF PROSTATE: ICD-10-CM

## 2022-08-26 DIAGNOSIS — Z13.220 ENCOUNTER FOR SCREENING FOR LIPOID DISORDERS: ICD-10-CM

## 2022-08-26 DIAGNOSIS — Z13.1 ENCOUNTER FOR SCREENING FOR DIABETES MELLITUS: ICD-10-CM

## 2022-08-26 LAB
ANION GAP SERPL CALCULATED.3IONS-SCNC: 8 MMOL/L (ref 7–15)
BUN SERPL-MCNC: 18 MG/DL (ref 8–23)
CALCIUM SERPL-MCNC: 9.2 MG/DL (ref 8.8–10.2)
CHLORIDE SERPL-SCNC: 102 MMOL/L (ref 98–107)
CHOLEST SERPL-MCNC: 142 MG/DL
CREAT SERPL-MCNC: 1.17 MG/DL (ref 0.67–1.17)
DEPRECATED HCO3 PLAS-SCNC: 29 MMOL/L (ref 22–29)
GFR SERPL CREATININE-BSD FRML MDRD: 68 ML/MIN/1.73M2
GLUCOSE SERPL-MCNC: 101 MG/DL (ref 70–99)
HDLC SERPL-MCNC: 43 MG/DL
LDLC SERPL CALC-MCNC: 85 MG/DL
NONHDLC SERPL-MCNC: 99 MG/DL
POTASSIUM SERPL-SCNC: 4.3 MMOL/L (ref 3.4–5.3)
PSA SERPL-MCNC: 7.28 NG/ML (ref 0–4.5)
SODIUM SERPL-SCNC: 139 MMOL/L (ref 136–145)
TRIGL SERPL-MCNC: 72 MG/DL

## 2022-08-26 PROCEDURE — 80061 LIPID PANEL: CPT | Mod: ORL | Performed by: FAMILY MEDICINE

## 2022-08-26 PROCEDURE — G0103 PSA SCREENING: HCPCS | Mod: ORL | Performed by: FAMILY MEDICINE

## 2022-08-26 PROCEDURE — 80048 BASIC METABOLIC PNL TOTAL CA: CPT | Mod: ORL | Performed by: FAMILY MEDICINE

## 2022-08-31 ENCOUNTER — TELEPHONE (OUTPATIENT)
Dept: CARDIOLOGY | Facility: CLINIC | Age: 67
End: 2022-08-31

## 2022-08-31 NOTE — TELEPHONE ENCOUNTER
[facility] Call Center    Phone Message    May a detailed message be left on voicemail: yes     Reason for Call: Medication Question or concern regarding medication   Prescription Clarification  Name of Medication: ELIQUIS ANTICOAGULANT 5 MG tablet  Prescribing Provider: Gian   Pharmacy: [facility]ACY #[id number] - [place] [id number]   What on the order needs clarification? [facility] called requesting a 3 day hold on this medication. Patient is having a colonoscopy 9/13. Thank you.          Action Taken: Message routed to:  Other: Cardiology    Travel Screening: Not Applicable

## 2022-08-31 NOTE — TELEPHONE ENCOUNTER
Noted.  [facility] wanting 3 day hold orders for Eliquis prior to colonoscopy on 9-13-22.    Pt with a hx of PAF, s/p PVI on 1/2019 with no recurrence.  CHADS VASc 2 for HTN and age >65.  Pt is on no antiarrythmic medication.    Pt was last seen on 4-8-22 with Dr. Springer and Felix Gross on 2-4-21.    Will review with Felix Gross and call [facility] with recommendations.

## 2022-09-01 NOTE — TELEPHONE ENCOUNTER
Maday Gross, APRN CNP  You 15 hours ago (4:40 PM)     JH    Ok to hold [id number] 3 full days before procedure and no bridging.  Restart when given ok.   Felix

## 2022-09-01 NOTE — TELEPHONE ENCOUNTER
Noted.  Phone call to [facility] and message left on confidential AC hold line.  Contact information left for further questions.

## 2022-10-31 ENCOUNTER — LAB (OUTPATIENT)
Dept: LAB | Facility: CLINIC | Age: 67
End: 2022-10-31
Payer: COMMERCIAL

## 2022-10-31 DIAGNOSIS — Z20.822 CLOSE EXPOSURE TO 2019 NOVEL CORONAVIRUS: ICD-10-CM

## 2022-10-31 PROCEDURE — U0003 INFECTIOUS AGENT DETECTION BY NUCLEIC ACID (DNA OR RNA); SEVERE ACUTE RESPIRATORY SYNDROME CORONAVIRUS 2 (SARS-COV-2) (CORONAVIRUS DISEASE [COVID-19]), AMPLIFIED PROBE TECHNIQUE, MAKING USE OF HIGH THROUGHPUT TECHNOLOGIES AS DESCRIBED BY CMS-2020-01-R: HCPCS

## 2022-10-31 PROCEDURE — U0005 INFEC AGEN DETEC AMPLI PROBE: HCPCS

## 2022-11-01 LAB — SARS-COV-2 RNA RESP QL NAA+PROBE: NEGATIVE

## 2022-11-20 ENCOUNTER — HEALTH MAINTENANCE LETTER (OUTPATIENT)
Age: 67
End: 2022-11-20

## 2022-12-12 ENCOUNTER — HOSPITAL ENCOUNTER (OUTPATIENT)
Dept: MRI IMAGING | Facility: HOSPITAL | Age: 67
Discharge: HOME | End: 2022-12-12
Attending: UROLOGY | Admitting: UROLOGY
Payer: COMMERCIAL

## 2022-12-12 DIAGNOSIS — R97.20 ELEVATED PROSTATE SPECIFIC ANTIGEN (PSA): ICD-10-CM

## 2022-12-12 PROCEDURE — 255N000002 HC RX 255 OP 636: Performed by: UROLOGY

## 2022-12-12 PROCEDURE — 72197 MRI PELVIS W/O & W/DYE: CPT

## 2022-12-12 PROCEDURE — A9585 GADOBUTROL INJECTION: HCPCS | Performed by: UROLOGY

## 2022-12-12 RX ORDER — GADOBUTROL 604.72 MG/ML
0.1 INJECTION INTRAVENOUS ONCE
Status: COMPLETED | OUTPATIENT
Start: 2022-12-12 | End: 2022-12-12

## 2022-12-12 RX ADMIN — GADOBUTROL 8 ML: 604.72 INJECTION INTRAVENOUS at 14:13

## 2022-12-15 DIAGNOSIS — I10 ESSENTIAL (PRIMARY) HYPERTENSION: ICD-10-CM

## 2022-12-15 RX ORDER — LOSARTAN POTASSIUM 25 MG/1
TABLET ORAL
Qty: 90 TABLET | Refills: 1 | Status: SHIPPED | OUTPATIENT
Start: 2022-12-15 | End: 2023-06-15

## 2022-12-22 DIAGNOSIS — I48.0 PAROXYSMAL ATRIAL FIBRILLATION (H): ICD-10-CM

## 2022-12-22 RX ORDER — APIXABAN 5 MG/1
TABLET, FILM COATED ORAL
Qty: 180 TABLET | Refills: 1 | Status: SHIPPED | OUTPATIENT
Start: 2022-12-22 | End: 2023-06-27

## 2023-04-24 DIAGNOSIS — I48.0 PAROXYSMAL ATRIAL FIBRILLATION (H): Primary | ICD-10-CM

## 2023-04-26 ENCOUNTER — OFFICE VISIT (OUTPATIENT)
Dept: CARDIOLOGY | Facility: CLINIC | Age: 68
End: 2023-04-26
Attending: INTERNAL MEDICINE
Payer: COMMERCIAL

## 2023-04-26 VITALS
SYSTOLIC BLOOD PRESSURE: 118 MMHG | HEIGHT: 67 IN | HEART RATE: 77 BPM | RESPIRATION RATE: 16 BRPM | WEIGHT: 187 LBS | DIASTOLIC BLOOD PRESSURE: 76 MMHG | BODY MASS INDEX: 29.35 KG/M2

## 2023-04-26 DIAGNOSIS — Z98.890 STATUS POST CATHETER ABLATION OF ATRIAL FIBRILLATION: ICD-10-CM

## 2023-04-26 DIAGNOSIS — I48.0 PAROXYSMAL ATRIAL FIBRILLATION (H): Primary | ICD-10-CM

## 2023-04-26 DIAGNOSIS — G47.33 OSA (OBSTRUCTIVE SLEEP APNEA): ICD-10-CM

## 2023-04-26 PROCEDURE — 93000 ELECTROCARDIOGRAM COMPLETE: CPT | Performed by: INTERNAL MEDICINE

## 2023-04-26 PROCEDURE — 99215 OFFICE O/P EST HI 40 MIN: CPT | Performed by: INTERNAL MEDICINE

## 2023-04-26 NOTE — LETTER
4/26/2023    Jamshid Zhu MD  [address]  [address]  [facility] [address]    RE: Amilcar Penaloza       Dear Colleague,     I had the pleasure of seeing Amilcar Penaloza in the [facility].    [facility]  Cardiac Electrophysiology     Progress Note: Royer Springer MD    Primary Care: Jamshid Zhu MD    Primary Cardiologist:     Primary Electrophysiologist: Royer Springer MD    Assessment:       Paroxysmal atrial fibrillation: Patient is 4 years out from his ablation procedure (PVI + RA CTI line) to treat his atrial fibrillation.  The patient remains off AAD and reports no symptomatic recurrence of atrial fibrillation or flutter..  The patient has an elevated [id number]-IJFk score of 2 and remains on OAC (apixaban).  He reports no bleeding complications (patient with history of Crohn's disease) at this time.  We have discussed in the past the potential role for left atrial appendage closure but without a clear indication to discontinue OAC then he should remain on based on current guidelines.  MAKAYLA: Diagnosed 2005.      Recommendations:  No change in current therapy.    Time spent: 40 minutes spent on the date of the encounter doing chart review, history and exam, documentation and further activities as noted above.    Subjective:  Amilcar Penaloza (67 year old male) returns to the arrhythmia clinic for interval reevaluation of his rhythm status post ablation in 2019.  The patient reports no tachypalpitations or other symptoms to indicate recurrence of atrial fibrillation or flutter.  He is active and not experiencing any exertional dyspnea or chest discomfort.  He reports no orthopnea, PND or ankle edema.    Current Outpatient Medications   Medication Sig Dispense Refill    ascorbic acid, vitamin C, (ASCORBIC ACID WITH BERNABE HIPS) 500 MG tablet [ASCORBIC ACID, VITAMIN C, (ASCORBIC ACID WITH BERNABE HIPS) 500 MG TABLET] Take 500 mg by mouth daily.      calcium carbonate  "(OS-MAYLIN) 600 mg calcium (1,500 mg) tablet [CALCIUM CARBONATE (OS-MAYLIN) 600 MG CALCIUM (1,500 MG) TABLET] Take 600 mg by mouth daily.      cholecalciferol, vitamin D3, 1,000 unit tablet [CHOLECALCIFEROL, VITAMIN D3, 1,000 UNIT TABLET] Take 1,000 Units by mouth daily.      ELIQUIS ANTICOAGULANT 5 MG tablet TAKE 1 TABLET BY MOUTH TWICE A  tablet 1    losartan (COZAAR) 25 MG tablet TAKE 1 TABLET BY MOUTH EVERY DAY 90 tablet 1    melatonin 3 mg cap [MELATONIN 3 MG CAP] 1 tab(s)      mercaptopurine (PURINETHOL) 50 mg tablet [MERCAPTOPURINE (PURINETHOL) 50 MG TABLET] Take 50 mg by mouth daily.      multivit-min/FA/lycopen/lutein (CENTRUM SILVER MEN ORAL) [MULTIVIT-MIN/FA/LYCOPEN/LUTEIN (CENTRUM SILVER MEN ORAL)] 1 tab(s)      oxybutynin ER (DITROPAN XL) 15 MG 24 hr tablet Take 15 mg by mouth daily         Review of Systems:     Family History  Family History   Problem Relation Age of Onset    Pacemaker Mother     CABG Mother 90.00    No Known Problems Father     No Known Problems Sister     No Known Problems Brother     No Known Problems Sister     Sudden Death Sister 58.00    Other - See Comments Sister 16.00        Motorcycle accident    No Known Problems Sister     No Known Problems Brother     No Known Problems Brother     No Known Problems Brother        Social History   reports that he has never smoked. He has never used smokeless tobacco.    Objective:   Vital signs in last 24 hours:  /76 (BP Location: Right arm, Patient Position: Sitting, Cuff Size: Adult Regular)   Pulse 77   Resp 16   Ht 1.702 m (5' 7\")   Wt 84.8 kg (187 lb)   BMI 29.29 kg/m      Physical Exam:  General: The patient is alert oriented to person place and situation.  The patient is in no acute distress at the time of my evaluation.  Eyes: Pupils are equal, round, and reactive to light.  Conjunctiva and sclera are clear.  ENT: Oral mucosa is moist and without redness. No evident nasal discharge.  Pulmonary: Lungs are clear " bilaterally with no rales, rhonchi, or wheezes.    Cardiovascular exam: Rhythm is regular. S1 and S2 are normal. No significant murmur is present. JVP is normal. Lower extremities demonstrate no significant edema. Distal pulses are intact bilaterally.  Abdomen is soft, nontender, no organomegaly, and bowel sounds present.  Musculoskeletal: Spine is straight. Extremities without deformity.  Neuro: Gait is normal.   Skin is warm, dry, and otherwise intact.      Cardiographics:   Twelve-lead EKG, tracing from today demonstrates normal sinus rhythm, normal study.    Lab Results:         Outside record review:            Thank you for allowing me to participate in the care of your patient.      Sincerely,     Royer Springer MD     [facility]  cc:   Royer Springer MD  [address]  [place] [address]

## 2023-04-26 NOTE — PROGRESS NOTES
[facility]  Cardiac Electrophysiology     Progress Note: Royer Springer MD    Primary Care: Jamshid Zhu MD    Primary Cardiologist:     Primary Electrophysiologist: Royer Springer MD    Assessment:         Paroxysmal atrial fibrillation: Patient is 4 years out from his ablation procedure (PVI + RA CTI line) to treat his atrial fibrillation.  The patient remains off AAD and reports no symptomatic recurrence of atrial fibrillation or flutter..  The patient has an elevated [id number]-[id number] score of 2 and remains on OAC (apixaban).  He reports no bleeding complications (patient with history of Crohn's disease) at this time.  We have discussed in the past the potential role for left atrial appendage closure but without a clear indication to discontinue OAC then he should remain on based on current guidelines.    MAKAYLA: Diagnosed 2005.      Recommendations:    No change in current therapy.    Time spent: 40 minutes spent on the date of the encounter doing chart review, history and exam, documentation and further activities as noted above.    Subjective:  Amilcar Penaloza (67 year old male) returns to the arrhythmia clinic for interval reevaluation of his rhythm status post ablation in 2019.  The patient reports no tachypalpitations or other symptoms to indicate recurrence of atrial fibrillation or flutter.  He is active and not experiencing any exertional dyspnea or chest discomfort.  He reports no orthopnea, PND or ankle edema.    Current Outpatient Medications   Medication Sig Dispense Refill     ascorbic acid, vitamin C, (ASCORBIC ACID WITH BERNABE HIPS) 500 MG tablet [ASCORBIC ACID, VITAMIN C, (ASCORBIC ACID WITH BERNABE HIPS) 500 MG TABLET] Take 500 mg by mouth daily.       calcium carbonate (OS-MAYLIN) 600 mg calcium (1,500 mg) tablet [CALCIUM CARBONATE (OS-MAYLIN) 600 MG CALCIUM (1,500 MG) TABLET] Take 600 mg by mouth daily.       cholecalciferol, vitamin D3, 1,000 unit tablet [CHOLECALCIFEROL, VITAMIN D3,  "1,000 UNIT TABLET] Take 1,000 Units by mouth daily.       ELIQUIS ANTICOAGULANT 5 MG tablet TAKE 1 TABLET BY MOUTH TWICE A  tablet 1     losartan (COZAAR) 25 MG tablet TAKE 1 TABLET BY MOUTH EVERY DAY 90 tablet 1     melatonin 3 mg cap [MELATONIN 3 MG CAP] 1 tab(s)       mercaptopurine (PURINETHOL) 50 mg tablet [MERCAPTOPURINE (PURINETHOL) 50 MG TABLET] Take 50 mg by mouth daily.       multivit-min/FA/lycopen/lutein (CENTRUM SILVER MEN ORAL) [MULTIVIT-MIN/FA/LYCOPEN/LUTEIN (CENTRUM SILVER MEN ORAL)] 1 tab(s)       oxybutynin ER (DITROPAN XL) 15 MG 24 hr tablet Take 15 mg by mouth daily         Review of Systems:     Family History  Family History   Problem Relation Age of Onset     Pacemaker Mother      CABG Mother 90.00     No Known Problems Father      No Known Problems Sister      No Known Problems Brother      No Known Problems Sister      Sudden Death Sister 58.00     Other - See Comments Sister 16.00        Motorcycle accident     No Known Problems Sister      No Known Problems Brother      No Known Problems Brother      No Known Problems Brother        Social History   reports that he has never smoked. He has never used smokeless tobacco.    Objective:   Vital signs in last 24 hours:  /76 (BP Location: Right arm, Patient Position: Sitting, Cuff Size: Adult Regular)   Pulse 77   Resp 16   Ht 1.702 m (5' 7\")   Wt 84.8 kg (187 lb)   BMI 29.29 kg/m      Physical Exam:  General: The patient is alert oriented to person place and situation.  The patient is in no acute distress at the time of my evaluation.  Eyes: Pupils are equal, round, and reactive to light.  Conjunctiva and sclera are clear.  ENT: Oral mucosa is moist and without redness. No evident nasal discharge.  Pulmonary: Lungs are clear bilaterally with no rales, rhonchi, or wheezes.    Cardiovascular exam: Rhythm is regular. S1 and S2 are normal. No significant murmur is present. JVP is normal. Lower extremities demonstrate no " significant edema. Distal pulses are intact bilaterally.  Abdomen is soft, nontender, no organomegaly, and bowel sounds present.  Musculoskeletal: Spine is straight. Extremities without deformity.  Neuro: Gait is normal.   Skin is warm, dry, and otherwise intact.      Cardiographics:   Twelve-lead EKG, tracing from today demonstrates normal sinus rhythm, normal study.    Lab Results:         Outside record review:

## 2023-04-27 LAB
ATRIAL RATE - MUSE: 79 BPM
DIASTOLIC BLOOD PRESSURE - MUSE: NORMAL MMHG
INTERPRETATION ECG - MUSE: NORMAL
P AXIS - MUSE: 74 DEGREES
PR INTERVAL - MUSE: 166 MS
QRS DURATION - MUSE: 92 MS
QT - MUSE: 360 MS
QTC - MUSE: 412 MS
R AXIS - MUSE: 43 DEGREES
SYSTOLIC BLOOD PRESSURE - MUSE: NORMAL MMHG
T AXIS - MUSE: 42 DEGREES
VENTRICULAR RATE- MUSE: 79 BPM

## 2023-06-15 DIAGNOSIS — I10 ESSENTIAL (PRIMARY) HYPERTENSION: ICD-10-CM

## 2023-06-15 RX ORDER — LOSARTAN POTASSIUM 25 MG/1
TABLET ORAL
Qty: 90 TABLET | Refills: 3 | Status: SHIPPED | OUTPATIENT
Start: 2023-06-15 | End: 2024-06-19

## 2023-06-27 DIAGNOSIS — I48.0 PAROXYSMAL ATRIAL FIBRILLATION (H): ICD-10-CM

## 2023-06-27 RX ORDER — APIXABAN 5 MG/1
TABLET, FILM COATED ORAL
Qty: 180 TABLET | Refills: 1 | Status: SHIPPED | OUTPATIENT
Start: 2023-06-27 | End: 2024-01-08

## 2023-07-08 ENCOUNTER — HEALTH MAINTENANCE LETTER (OUTPATIENT)
Age: 68
End: 2023-07-08

## 2023-08-28 ENCOUNTER — LAB REQUISITION (OUTPATIENT)
Dept: LAB | Facility: CLINIC | Age: 68
End: 2023-08-28
Payer: COMMERCIAL

## 2023-08-28 DIAGNOSIS — I10 ESSENTIAL (PRIMARY) HYPERTENSION: ICD-10-CM

## 2023-08-28 DIAGNOSIS — N40.1 BENIGN PROSTATIC HYPERPLASIA WITH LOWER URINARY TRACT SYMPTOMS: ICD-10-CM

## 2023-08-28 LAB
ANION GAP SERPL CALCULATED.3IONS-SCNC: 11 MMOL/L (ref 7–15)
BUN SERPL-MCNC: 20.8 MG/DL (ref 8–23)
CALCIUM SERPL-MCNC: 9.2 MG/DL (ref 8.8–10.2)
CHLORIDE SERPL-SCNC: 102 MMOL/L (ref 98–107)
CHOLEST SERPL-MCNC: 126 MG/DL
CREAT SERPL-MCNC: 1.14 MG/DL (ref 0.67–1.17)
DEPRECATED HCO3 PLAS-SCNC: 26 MMOL/L (ref 22–29)
GFR SERPL CREATININE-BSD FRML MDRD: 70 ML/MIN/1.73M2
GLUCOSE SERPL-MCNC: 92 MG/DL (ref 70–99)
HDLC SERPL-MCNC: 41 MG/DL
LDLC SERPL CALC-MCNC: 71 MG/DL
NONHDLC SERPL-MCNC: 85 MG/DL
POTASSIUM SERPL-SCNC: 4.1 MMOL/L (ref 3.4–5.3)
PSA SERPL DL<=0.01 NG/ML-MCNC: 0.04 NG/ML (ref 0–4.5)
SODIUM SERPL-SCNC: 139 MMOL/L (ref 136–145)
TRIGL SERPL-MCNC: 69 MG/DL

## 2023-08-28 PROCEDURE — 84153 ASSAY OF PSA TOTAL: CPT | Mod: ORL | Performed by: FAMILY MEDICINE

## 2023-08-28 PROCEDURE — 80061 LIPID PANEL: CPT | Mod: ORL | Performed by: FAMILY MEDICINE

## 2023-08-28 PROCEDURE — 80048 BASIC METABOLIC PNL TOTAL CA: CPT | Mod: ORL | Performed by: FAMILY MEDICINE

## 2023-08-28 PROCEDURE — G0103 PSA SCREENING: HCPCS | Mod: ORL | Performed by: FAMILY MEDICINE

## 2024-01-07 DIAGNOSIS — I48.0 PAROXYSMAL ATRIAL FIBRILLATION (H): ICD-10-CM

## 2024-01-08 RX ORDER — APIXABAN 5 MG/1
TABLET, FILM COATED ORAL
Qty: 180 TABLET | Refills: 1 | Status: SHIPPED | OUTPATIENT
Start: 2024-01-08 | End: 2024-04-26

## 2024-04-25 NOTE — PROGRESS NOTES
Thank you, Dr. Springer, for asking the [facility] team to see Mr. Amilcar Penaloza to evaluate PAF.    Assessment/Recommendations     Assessment/Plan:    Diagnoses and all orders for this visit:  Paroxysmal atrial fibrillation (H)  Status post catheter ablation of atrial fibrillation  Dx/date: Amilcar Penaloza has a known history of paroxysmal atrial fibrillation 112 bpm per 12 lead EKG, which was identified at the time of the preop history and physical prior to his knee surgery on 2/5/2018. At follow up appointment with cardiology, he was noted to be back in NSR @ 71 bpm.   Sx: intermittent dizziness. Denies SOB, palpitations, CP or syncope   Rate control: none currently. Metoprolol discontinued post ablation 1/2019  Home ECG monitoring: None, he has no symptomatic awareness of his arrhythmia so we discussed the options of Apple Watch versus [employer] mobile device to have on hand at home for future ECG monitoring       [id number] score of 2 due to age, HTN and on Eliquis. No bleeding issues reported. He does typically miss one dose of Eliquis each month in the evening. He did miss one dose last week around 4/19/24.       Essential (primary) hypertension  -/78, well controlled on current medication regimen    MAKAYLA (obstructive sleep apnea)  - diagnosed 2005, compliant with CPAP use.     PLAN:   Continue with Eliquis for stroke prevention  Continue with CPAP use for management of MAKAYLA  Continue with Losartan at current dose for HTN managment  Follow up in one year with Dr Springer     History of Present Illness/Subjective     Amilcar Penaloza is a very pleasant 68 year old male who comes in today for EP follow up PAF    Amilcar Penaloza has a known history of PAF, typical AFL, HTN, MAKAYLA.    Arrhythmia hx  Dx/date: Amilcar Penaloza has a known history of paroxysmal atrial fibrillation 112 bpm per 12 lead EKG, which was identified at the time of the preop history and physical prior to his knee surgery  on 2/5/2018. At follow up appointment with cardiology, NSR 71 bpm.   Sx: intermittent dizziness. Denies SOB, palpitations, CP or syncope   Prior AAD, AV garrison blocking agents: Metoprolol 2018 up until ablation in 2019 then discontinued post ablation   OAC: Eliquis  Procedures  DCCV: no  Ablation: PVI + R CTI ablation Dr Springer 1/23/2019    Amilcar continues to do well 5 years post PVI plus right CTI ablation.  He remains off antiarrhythmic and AV garrison blocking medications, metoprolol was discontinued after his ablation back in 2019.  He has never had any awareness of his arrhythmia based on symptoms other than some mild intermittent dizziness.  He denies any recent chest pain, shortness of breath, fatigue, dizziness or near syncope since his ablation.  He remains on Eliquis twice daily for stroke prevention and he does admit that he misses on average 1 dose per month in the evening.  He did miss a dose last week.  He denies any bleeding recurrence such as nosebleeds, rectal bleeding or hematuria.  He is fairly active splitting wood, hunting and fishing taking walks and bikes anywhere between 10 and 20 miles at a time in the summer.  He does not monitor his ECG or ventricular rates at home by way of smart watch or [employer] device.  He remains compliant with CPAP use for management of his MAKAYLA.  His blood pressure remains well-controlled on his current dosing of losartan      Cardiographics (reviewed):  ECG  4/26/23 NSR 79 bpm QT/QTC: 360/412 ms  1/17/19 NSR incomplete RBBB 68 bpm QRS 92 ms QT/QTC: 384/408 ms  2/5/18  bpm    HOLTER MONITOR REPORT     INTERPRETATION DATE:  03/21/2019:       TEST DATE:  03/07/2019 through 03/21/2019     INTERPRETATION:  Amilcar Penaloza had a patch type patient activated ECG monitor  between 03/07/2019 and 03/21/2019 for paroxysmal atrial fibrillation.  He was quite  compliant with wearing the patch during this time.  Baseline transmission on 03/07  showed normal sinus rhythm, rate 80 to  90 beats per minute, with occasional atrial  premature beats.  No other strips were transmitted during the monitoring period.   ACT heart rates generally ranged between 60 and 100 beats per minute.  No  symptomatic episodes were reported.  No atrial fibrillation was detected and there  were no pauses greater than 3 seconds.      TTE 8/3/2018   Left Ventricle: Normal left ventricular size and systolic function.The calculated left ventricular ejection fraction is 61%. This represents a normal ejection fraction. The left ventricular wall thickness is normal. E/e' < 8, suggesting normal LV   filling pressure.Normal left atrial pressure.    The left ventricular wall motion is normal.    Right Ventricle: Normal right ventricular size and systolic function. TAPSE is normal, which is consistent with normal right ventricular systolic function.     Compared to prior study performed on February 9, 2018, the left ventricular ejection fraction is mildly improved on the current study.  On the prior study, the patient appeared to start in atrial fibrillation and then converted to sinus rhythm.  On the   current study the patient appears to be in sinus bradycardia and normal sinus rhythm for the entirety of the study.   Problem List:  Patient Active Problem List   Diagnosis    Crohn's colitis (H)    Paroxysmal atrial fibrillation (H)    Typical atrial flutter (H)    MAKAYLA (obstructive sleep apnea)    Essential hypertension, benign    Status post catheter ablation of atrial fibrillation     Revi  e  Physical Examination Review of Systems   [facility]  There were no vitals taken for this visit.  There is no height or weight on file to calculate BMI.  Wt Readings from Last 3 Encounters:   04/26/23 84.8 kg (187 lb)   04/08/22 79.8 kg (176 lb)   02/04/21 82.6 kg (182 lb)     General Appearance:   Alert, well-appearing and in no acute distress.   HEENT: Atraumatic, normocephalic.  No scleral icterus, normal conjunctivae; mucous membranes  pink and moist.     Chest: Chest symmetric, spine straight.   Lungs:   Respirations unlabored: Lungs are clear to auscultation.   Cardiovascular:   Normal first and second heart sounds with no murmurs, rubs, or gallops.  Regular, regular.   Normal JVD, no edema.       Extremities: No cyanosis or clubbing   Musculoskeletal: Moves all extremities   Skin: Warm, dry, intact.    Neurologic: Mood and affect are appropriate, alert and oriented to person, place, time, and situation     ROS: 10 point ROS neg other than the symptoms noted above in the HPI.     Medical History  Surgical History Family History Social History     Past Medical History:   Diagnosis Date    Acute Crohn's disease (H) 2001    Colitis 2001    CPAP (continuous positive airway pressure) dependence 2011    Hypertension 2019    MAKAYLA (obstructive sleep apnea) 3/29/2018    Was previously on CPAP but not using    PAF (paroxysmal atrial fibrillation) (H) 3/29/2018    Dx Feb 2018 Mild symptoms, mild CM RVR + flutter [id number] score = 1 (HTN)    Status post catheter ablation of atrial fibrillation 1/23/2019    PVI Jan 23, 2019 (cryo-PVI + RA-CTI line)    Typical atrial flutter (H) 3/29/2018    Symptomatic (Holter) Light headed with RVR (160 bpm)    Past Surgical History:   Procedure Laterality Date    ARTHROSCOPY KNEE Right 2018    menicus repair    EP ABLATION AFLUTTER  1/23/2019    Procedure: EP Ablation Atrial Flutter;  Surgeon: Royer Springer MD;  Location: [facility];  Service: Cardiology    EP ABLATION PVI Left 1/23/2019    Procedure: EP Ablation PVI;  Surgeon: Royer Springer MD;  Location: [facility];  Service: Cardiology    LASIK  2012    NOSE SURGERY  2013    TRANSRECTAL ULTRASONIC, TRANSURETHRAL RESECTION (TUR) OF PROSTATE CYST  2015    VASECTOMY  20 years ago    Family History   Problem Relation Age of Onset    Pacemaker Mother     CABG Mother 90    No Known Problems Father     No Known Problems Sister     No Known Problems  Sister     Sudden Death Sister 58    Other - See Comments Sister 16        Motorcycle accident    No Known Problems Sister     Atrial fibrillation Brother         Ablation    No Known Problems Brother     No Known Problems Brother     No Known Problems Brother     History   Smoking Status    Never   Smokeless Tobacco    Never     Social History    Substance and Sexual Activity      Alcohol use: Not on file       Medications  Allergies     Current Outpatient Medications   Medication Sig Dispense Refill    ascorbic acid, vitamin C, (ASCORBIC ACID WITH BERNABE HIPS) 500 MG tablet [ASCORBIC ACID, VITAMIN C, (ASCORBIC ACID WITH BERNABE HIPS) 500 MG TABLET] Take 500 mg by mouth daily.      calcium carbonate (OS-MAYLIN) 600 mg calcium (1,500 mg) tablet [CALCIUM CARBONATE (OS-MAYLIN) 600 MG CALCIUM (1,500 MG) TABLET] Take 600 mg by mouth daily.      cholecalciferol, vitamin D3, 1,000 unit tablet [CHOLECALCIFEROL, VITAMIN D3, 1,000 UNIT TABLET] Take 1,000 Units by mouth daily.      ELIQUIS ANTICOAGULANT 5 MG tablet TAKE 1 TABLET BY MOUTH TWICE A  tablet 1    losartan (COZAAR) 25 MG tablet TAKE 1 TABLET BY MOUTH EVERY DAY 90 tablet 3    mercaptopurine (PURINETHOL) 50 mg tablet [MERCAPTOPURINE (PURINETHOL) 50 MG TABLET] Take 50 mg by mouth daily.      multivit-min/FA/lycopen/lutein (CENTRUM SILVER MEN ORAL) [MULTIVIT-MIN/FA/LYCOPEN/LUTEIN (CENTRUM SILVER MEN ORAL)] 1 tab(s)      oxybutynin ER (DITROPAN XL) 15 MG 24 hr tablet Take 15 mg by mouth daily        Allergies   Allergen Reactions    Sulfa (Sulfonamide Antibiotics) [Sulfa Antibiotics] Swelling      Medical, surgical, family, social history, and medications were all reviewed and updated as necessary.   Lab Results    Chemistry/lipid CBC Cardiac Enzymes/BNP/TSH/INR   Recent Labs   Lab Test 08/28/23  1229   CHOL 126   HDL 41   LDL 71   TRIG 69     Recent Labs   Lab Test 08/28/23  1229 08/26/22  0947 08/25/21  1152   LDL 71 85 82     Recent Labs   Lab Test 08/28/23  1229   NA  "139   POTASSIUM 4.1   CHLORIDE 102   CO2 26   GLC 92   BUN 20.8   CR 1.14   GFRESTIMATED 70   MAYLIN 9.2     Recent Labs   Lab Test 08/28/23  1229 08/26/22  0947 08/25/21  1152   CR 1.14 1.17 1.23     No results for input(s): \"A1C\" in the last 73321 hours.       Recent Labs   Lab Test 08/18/20  1347   WBC 6.2   HGB 14.3   HCT 41.5   MCV 94        Recent Labs   Lab Test 08/18/20  1347 06/06/19  1111 01/17/19  1025   HGB 14.3 14.1 16.0    No results for input(s): \"TROPONINI\" in the last 15995 hours.  No results for input(s): \"BNP\", \"NTBNPI\", \"NTBNP\" in the last 15754 hours.  Recent Labs   Lab Test 02/05/18  1437   TSH 2.18     No results for input(s): \"INR\" in the last 88748 hours.       Total Time- 30 minutes spent on date of encounter doing chart review, history and exam, documentation and further activities as noted above.  This note has been dictated using voice recognition software. Any grammatical, typographical, or context distortions are unintentional and inherent to the software.    Jeanette Mejias [facility]  [phone]                       "

## 2024-04-26 ENCOUNTER — OFFICE VISIT (OUTPATIENT)
Dept: CARDIOLOGY | Facility: CLINIC | Age: 69
End: 2024-04-26
Attending: INTERNAL MEDICINE
Payer: COMMERCIAL

## 2024-04-26 VITALS
WEIGHT: 188 LBS | HEART RATE: 63 BPM | HEIGHT: 67 IN | BODY MASS INDEX: 29.51 KG/M2 | DIASTOLIC BLOOD PRESSURE: 78 MMHG | RESPIRATION RATE: 16 BRPM | SYSTOLIC BLOOD PRESSURE: 132 MMHG

## 2024-04-26 DIAGNOSIS — G47.33 OSA (OBSTRUCTIVE SLEEP APNEA): ICD-10-CM

## 2024-04-26 DIAGNOSIS — I48.0 PAROXYSMAL ATRIAL FIBRILLATION (H): Primary | ICD-10-CM

## 2024-04-26 DIAGNOSIS — I10 ESSENTIAL (PRIMARY) HYPERTENSION: ICD-10-CM

## 2024-04-26 DIAGNOSIS — Z98.890 STATUS POST CATHETER ABLATION OF ATRIAL FIBRILLATION: ICD-10-CM

## 2024-04-26 PROCEDURE — 99207 PR NO CHARGE LOS: CPT | Performed by: NURSE PRACTITIONER

## 2024-04-26 NOTE — LETTER
4/26/2024    Jamshid Zhu MD  [address]  [place] [address]    RE: Amilcar Penaloza       Dear Colleague,     I had the pleasure of seeing Amilcar Penaloza in the [facility].    Thank you, Dr. Springer, for asking the [facility] team to see Mr. Amilcar Penaloza to evaluate PAF.    Assessment/Recommendations     Assessment/Plan:    Diagnoses and all orders for this visit:  Paroxysmal atrial fibrillation (H)  Status post catheter ablation of atrial fibrillation  Dx/date: Amilcar Penaloza has a known history of paroxysmal atrial fibrillation 112 bpm per 12 lead EKG, which was identified at the time of the preop history and physical prior to his knee surgery on 2/5/2018. At follow up appointment with cardiology, he was noted to be back in NSR @ 71 bpm.   Sx: intermittent dizziness. Denies SOB, palpitations, CP or syncope   Rate control: none currently. Metoprolol discontinued post ablation 1/2019  Home ECG monitoring: None, he has no symptomatic awareness of his arrhythmia so we discussed the options of Apple Watch versus [employer] mobile device to have on hand at home for future ECG monitoring       [id number] score of 2 due to age, HTN and on Eliquis. No bleeding issues reported. He does typically miss one dose of Eliquis each month in the evening. He did miss one dose last week around 4/19/24.       Essential (primary) hypertension  -/78, well controlled on current medication regimen    MAKAYLA (obstructive sleep apnea)  - diagnosed 2005, compliant with CPAP use.     PLAN:   Continue with Eliquis for stroke prevention  Continue with CPAP use for management of MAKAYLA  Continue with Losartan at current dose for HTN managment  Follow up in one year with Dr Springer     History of Present Illness/Subjective     Amilcar Penaloza is a very pleasant 68 year old male who comes in today for EP follow up PAF    Amilcar Penaloza has a known history of PAF, typical AFL,  HTN, MAKAYLA.    Arrhythmia hx  Dx/date: Amilcar Penaloza has a known history of paroxysmal atrial fibrillation 112 bpm per 12 lead EKG, which was identified at the time of the preop history and physical prior to his knee surgery on 2/5/2018. At follow up appointment with cardiology, NSR 71 bpm.   Sx: intermittent dizziness. Denies SOB, palpitations, CP or syncope   Prior AAD, AV garrison blocking agents: Metoprolol 2018 up until ablation in 2019 then discontinued post ablation   OAC: Eliquis  Procedures  DCCV: no  Ablation: PVI + R CTI ablation Dr Springer 1/23/2019    Amilcar continues to do well 5 years post PVI plus right CTI ablation.  He remains off antiarrhythmic and AV garrison blocking medications, metoprolol was discontinued after his ablation back in 2019.  He has never had any awareness of his arrhythmia based on symptoms other than some mild intermittent dizziness.  He denies any recent chest pain, shortness of breath, fatigue, dizziness or near syncope since his ablation.  He remains on Eliquis twice daily for stroke prevention and he does admit that he misses on average 1 dose per month in the evening.  He did miss a dose last week.  He denies any bleeding recurrence such as nosebleeds, rectal bleeding or hematuria.  He is fairly active splitting wood, hunting and fishing taking walks and bikes anywhere between 10 and 20 miles at a time in the summer.  He does not monitor his ECG or ventricular rates at home by way of smart watch or [employer] device.  He remains compliant with CPAP use for management of his MAKAYLA.  His blood pressure remains well-controlled on his current dosing of losartan      Cardiographics (reviewed):  ECG  4/26/23 NSR 79 bpm QT/QTC: 360/412 ms  1/17/19 NSR incomplete RBBB 68 bpm QRS 92 ms QT/QTC: 384/408 ms  2/5/18  bpm    HOLTER MONITOR REPORT     INTERPRETATION DATE:  03/21/2019:       TEST DATE:  03/07/2019 through 03/21/2019     INTERPRETATION:  Amilcar Penaloza had a patch type patient  activated ECG monitor  between 03/07/2019 and 03/21/2019 for paroxysmal atrial fibrillation.  He was quite  compliant with wearing the patch during this time.  Baseline transmission on 03/07  showed normal sinus rhythm, rate 80 to 90 beats per minute, with occasional atrial  premature beats.  No other strips were transmitted during the monitoring period.   ACT heart rates generally ranged between 60 and 100 beats per minute.  No  symptomatic episodes were reported.  No atrial fibrillation was detected and there  were no pauses greater than 3 seconds.      TTE 8/3/2018   Left Ventricle: Normal left ventricular size and systolic function.The calculated left ventricular ejection fraction is 61%. This represents a normal ejection fraction. The left ventricular wall thickness is normal. E/e' < 8, suggesting normal LV   filling pressure.Normal left atrial pressure.    The left ventricular wall motion is normal.    Right Ventricle: Normal right ventricular size and systolic function. TAPSE is normal, which is consistent with normal right ventricular systolic function.     Compared to prior study performed on February 9, 2018, the left ventricular ejection fraction is mildly improved on the current study.  On the prior study, the patient appeared to start in atrial fibrillation and then converted to sinus rhythm.  On the   current study the patient appears to be in sinus bradycardia and normal sinus rhythm for the entirety of the study.   Problem List:  Patient Active Problem List   Diagnosis    Crohn's colitis (H)    Paroxysmal atrial fibrillation (H)    Typical atrial flutter (H)    MAKAYLA (obstructive sleep apnea)    Essential hypertension, benign    Status post catheter ablation of atrial fibrillation     Revi  e  Physical Examination Review of Systems   [facility]  There were no vitals taken for this visit.  There is no height or weight on file to calculate BMI.  Wt Readings from Last 3 Encounters:   04/26/23 84.8 kg (187  lb)   04/08/22 79.8 kg (176 lb)   02/04/21 82.6 kg (182 lb)     General Appearance:   Alert, well-appearing and in no acute distress.   HEENT: Atraumatic, normocephalic.  No scleral icterus, normal conjunctivae; mucous membranes pink and moist.     Chest: Chest symmetric, spine straight.   Lungs:   Respirations unlabored: Lungs are clear to auscultation.   Cardiovascular:   Normal first and second heart sounds with no murmurs, rubs, or gallops.  Regular, regular.   Normal JVD, no edema.       Extremities: No cyanosis or clubbing   Musculoskeletal: Moves all extremities   Skin: Warm, dry, intact.    Neurologic: Mood and affect are appropriate, alert and oriented to person, place, time, and situation     ROS: 10 point ROS neg other than the symptoms noted above in the HPI.     Medical History  Surgical History Family History Social History     Past Medical History:   Diagnosis Date    Acute Crohn's disease (H) 2001    Colitis 2001    CPAP (continuous positive airway pressure) dependence 2011    Hypertension 2019    MAKAYLA (obstructive sleep apnea) 3/29/2018    Was previously on CPAP but not using    PAF (paroxysmal atrial fibrillation) (H) 3/29/2018    Dx Feb 2018 Mild symptoms, mild CM RVR + flutter [id number] score = 1 (HTN)    Status post catheter ablation of atrial fibrillation 1/23/2019    PVI Jan 23, 2019 (cryo-PVI + RA-CTI line)    Typical atrial flutter (H) 3/29/2018    Symptomatic (Holter) Light headed with RVR (160 bpm)    Past Surgical History:   Procedure Laterality Date    ARTHROSCOPY KNEE Right 2018    menicus repair    EP ABLATION AFLUTTER  1/23/2019    Procedure: EP Ablation Atrial Flutter;  Surgeon: Royer Springer MD;  Location: [facility];  Service: Cardiology    EP ABLATION PVI Left 1/23/2019    Procedure: EP Ablation PVI;  Surgeon: Royer Springer MD;  Location: [facility];  Service: Cardiology    LASIK  2012    NOSE SURGERY  2013    TRANSRECTAL ULTRASONIC, TRANSURETHRAL  RESECTION (TUR) OF PROSTATE CYST  2015    VASECTOMY  20 years ago    Family History   Problem Relation Age of Onset    Pacemaker Mother     CABG Mother 90    No Known Problems Father     No Known Problems Sister     No Known Problems Sister     Sudden Death Sister 58    Other - See Comments Sister 16        Motorcycle accident    No Known Problems Sister     Atrial fibrillation Brother         Ablation    No Known Problems Brother     No Known Problems Brother     No Known Problems Brother     History   Smoking Status    Never   Smokeless Tobacco    Never     Social History    Substance and Sexual Activity      Alcohol use: Not on file       Medications  Allergies     Current Outpatient Medications   Medication Sig Dispense Refill    ascorbic acid, vitamin C, (ASCORBIC ACID WITH BERNABE HIPS) 500 MG tablet [ASCORBIC ACID, VITAMIN C, (ASCORBIC ACID WITH BERNABE HIPS) 500 MG TABLET] Take 500 mg by mouth daily.      calcium carbonate (OS-MAYLIN) 600 mg calcium (1,500 mg) tablet [CALCIUM CARBONATE (OS-MAYLIN) 600 MG CALCIUM (1,500 MG) TABLET] Take 600 mg by mouth daily.      cholecalciferol, vitamin D3, 1,000 unit tablet [CHOLECALCIFEROL, VITAMIN D3, 1,000 UNIT TABLET] Take 1,000 Units by mouth daily.      ELIQUIS ANTICOAGULANT 5 MG tablet TAKE 1 TABLET BY MOUTH TWICE A  tablet 1    losartan (COZAAR) 25 MG tablet TAKE 1 TABLET BY MOUTH EVERY DAY 90 tablet 3    mercaptopurine (PURINETHOL) 50 mg tablet [MERCAPTOPURINE (PURINETHOL) 50 MG TABLET] Take 50 mg by mouth daily.      multivit-min/FA/lycopen/lutein (CENTRUM SILVER MEN ORAL) [MULTIVIT-MIN/FA/LYCOPEN/LUTEIN (CENTRUM SILVER MEN ORAL)] 1 tab(s)      oxybutynin ER (DITROPAN XL) 15 MG 24 hr tablet Take 15 mg by mouth daily        Allergies   Allergen Reactions    Sulfa (Sulfonamide Antibiotics) [Sulfa Antibiotics] Swelling      Medical, surgical, family, social history, and medications were all reviewed and updated as necessary.   Lab Results    Chemistry/lipid CBC Cardiac  "Enzymes/BNP/TSH/INR   Recent Labs   Lab Test 08/28/23  1229   CHOL 126   HDL 41   LDL 71   TRIG 69     Recent Labs   Lab Test 08/28/23  1229 08/26/22  0947 08/25/21  1152   LDL 71 85 82     Recent Labs   Lab Test 08/28/23  1229      POTASSIUM 4.1   CHLORIDE 102   CO2 26   GLC 92   BUN 20.8   CR 1.14   GFRESTIMATED 70   MAYLIN 9.2     Recent Labs   Lab Test 08/28/23  1229 08/26/22  0947 08/25/21  1152   CR 1.14 1.17 1.23     No results for input(s): \"A1C\" in the last 05677 hours.       Recent Labs   Lab Test 08/18/20  1347   WBC 6.2   HGB 14.3   HCT 41.5   MCV 94        Recent Labs   Lab Test 08/18/20  1347 06/06/19  1111 01/17/19  1025   HGB 14.3 14.1 16.0    No results for input(s): \"TROPONINI\" in the last 55737 hours.  No results for input(s): \"BNP\", \"NTBNPI\", \"NTBNP\" in the last 32921 hours.  Recent Labs   Lab Test 02/05/18  1437   TSH 2.18     No results for input(s): \"INR\" in the last 36953 hours.       Total Time- 30 minutes spent on date of encounter doing chart review, history and exam, documentation and further activities as noted above.  This note has been dictated using voice recognition software. Any grammatical, typographical, or context distortions are unintentional and inherent to the software.    Jeanette Mejias CNP  [facility]  [phone]                         Thank you for allowing me to participate in the care of your patient.      Sincerely,     Jeanette Mejias NP     [facility]  cc:   Royer Springer MD  [address]  [place] [address]      "

## 2024-04-26 NOTE — PATIENT INSTRUCTIONS
Amilcar Penaloza,    It was a pleasure to see you today at the [facility].     My recommendations after this visit include:    Continue on Eliquis for stroke prevention  Consider [employer]ardia vs Apple Watch to have on hand for future ECG monitoring at home to assess in case of AF recurrence.   Follow up again with Dr Springer in one year     Jeanette Mejias CNP  [facility], Electrophysiology  [phone]  EP nurses [phone]

## 2024-06-19 DIAGNOSIS — I10 ESSENTIAL (PRIMARY) HYPERTENSION: ICD-10-CM

## 2024-06-19 RX ORDER — LOSARTAN POTASSIUM 25 MG/1
TABLET ORAL
Qty: 90 TABLET | Refills: 3 | Status: SHIPPED | OUTPATIENT
Start: 2024-06-19

## 2024-08-31 ENCOUNTER — HEALTH MAINTENANCE LETTER (OUTPATIENT)
Age: 69
End: 2024-08-31

## 2024-09-10 ENCOUNTER — LAB REQUISITION (OUTPATIENT)
Dept: LAB | Facility: CLINIC | Age: 69
End: 2024-09-10
Payer: COMMERCIAL

## 2024-09-10 DIAGNOSIS — Z13.220 ENCOUNTER FOR SCREENING FOR LIPOID DISORDERS: ICD-10-CM

## 2024-09-10 DIAGNOSIS — I10 ESSENTIAL (PRIMARY) HYPERTENSION: ICD-10-CM

## 2024-09-10 LAB
ANION GAP SERPL CALCULATED.3IONS-SCNC: 11 MMOL/L (ref 7–15)
BUN SERPL-MCNC: 29 MG/DL (ref 8–23)
CALCIUM SERPL-MCNC: 9.3 MG/DL (ref 8.8–10.4)
CHLORIDE SERPL-SCNC: 103 MMOL/L (ref 98–107)
CHOLEST SERPL-MCNC: 145 MG/DL
CREAT SERPL-MCNC: 1.16 MG/DL (ref 0.67–1.17)
EGFRCR SERPLBLD CKD-EPI 2021: 68 ML/MIN/1.73M2
FASTING STATUS PATIENT QL REPORTED: ABNORMAL
FASTING STATUS PATIENT QL REPORTED: NORMAL
GLUCOSE SERPL-MCNC: 94 MG/DL (ref 70–99)
HCO3 SERPL-SCNC: 24 MMOL/L (ref 22–29)
HDLC SERPL-MCNC: 41 MG/DL
LDLC SERPL CALC-MCNC: 87 MG/DL
NONHDLC SERPL-MCNC: 104 MG/DL
POTASSIUM SERPL-SCNC: 4.1 MMOL/L (ref 3.4–5.3)
SODIUM SERPL-SCNC: 138 MMOL/L (ref 135–145)
TRIGL SERPL-MCNC: 84 MG/DL

## 2024-09-10 PROCEDURE — 80061 LIPID PANEL: CPT | Mod: ORL | Performed by: FAMILY MEDICINE

## 2024-09-10 PROCEDURE — 80048 BASIC METABOLIC PNL TOTAL CA: CPT | Mod: ORL | Performed by: FAMILY MEDICINE

## 2024-09-13 ENCOUNTER — TELEPHONE (OUTPATIENT)
Dept: CARDIOLOGY | Facility: CLINIC | Age: 69
End: 2024-09-13
Payer: COMMERCIAL

## 2024-09-13 NOTE — TELEPHONE ENCOUNTER
Request for Anticoagulation Interruption    Procedure: Colonoscopy  Requested hold time from facility: 2 days prior  Date of procedure:  10-22-24  Anticoagulation medication: Eliquis  [id number] score: 2  CrCl, mL/min: 71.47  Previous Procedures: Pt has not had hx of recent PVI in the past 3mo, does not have anticipated PVI within the next 1 mo, DCCV in the past 4 wks, and/or LAAO- restricting interruption in AC  Guidelines: Low Risk (Eliquis, Xarelto) with CrCl ml/min: > or = to 30 discontinue > or = to  24hrs, 15-29 discontinue > or = to 36hrs, <15 No Data consider anti Xa level an/or > or = to 48 hrs. Uncertain/Intermediate/High Risk (Eliquis, Xarelto) with CrCl ml/min: > or = to 30 discontinue > or = to 48 hrs, < 30 No data consider anti Xa level and/or > or = to 72 hrs    lease advise hold orders, with or without need for bridging  Thanks you,  9/13/2024 11:27 AM  Cyn June RN     CrCl Calculator Cockcroft-Gault Equation- Micromedex    2017 ACC Expert ConsensusDecision Pathway for PeriproceduralManagement of Anticoagulation inPatients With Nonvalvular Atrial Fibrillation    male  68 year old  Wt Readings from Last 1 Encounters:   04/26/24 85.3 kg (188 lb)     Most Recent 3 BMP's:  Recent Labs   Lab Test 09/10/24  1200 08/28/23  1229 08/26/22  0947    139 139   POTASSIUM 4.1 4.1 4.3   CHLORIDE 103 102 102   CO2 24 26 29   BUN 29.0* 20.8 18.0   CR 1.16 1.14 1.17   ANIONGAP 11 11 8   MAYLIN 9.3 9.2 9.2   GLC 94 92 101*

## 2024-09-13 NOTE — TELEPHONE ENCOUNTER
[facility] Call Center    Phone Message    May a detailed message be left on voicemail: yes     Reason for Call: Medication Question or concern regarding medication   Prescription Clarification  Name of Medication: apixaban ANTICOAGULANT (ELIQUIS ANTICOAGULANT) 5 MG tablet   Prescribing Provider:    What on the order needs clarification?   Patient is having a colonoscopy on 10/22/24. [facility] is requesting a  2 days hold and bridge of medication starting on 10/20/24. If no 2 hold most resent creatinine or clearance within 90 days of procedure is requested. Please reach out to [facility]      Action Taken: Other: cardiology     Travel Screening: Not Applicable    Thank you!  [facility]       Date of Service:

## 2024-09-16 NOTE — TELEPHONE ENCOUNTER
Jeanette Mejias, NP  You1 hour ago (8:09 AM)       Okay for requested hold. He was maintaining NSR when I saw him back in April. No bridging needed.  Recommended [employer] smart watch at that time since he has no symptomatic awareness of his arrhythmia so would follow up on this in case of recurrence in the future.    ThanksJeanette

## 2024-09-16 NOTE — TELEPHONE ENCOUNTER
Noted.  Phone call to [facility], reviewed recommendations with anticoagulation hold nurse.  She states understanding, will follow up with patient, no further questions.

## 2024-11-25 ENCOUNTER — HOSPITAL ENCOUNTER (OUTPATIENT)
Dept: MRI IMAGING | Facility: CLINIC | Age: 69
Discharge: HOME | End: 2024-11-25
Attending: STUDENT IN AN ORGANIZED HEALTH CARE EDUCATION/TRAINING PROGRAM | Admitting: STUDENT IN AN ORGANIZED HEALTH CARE EDUCATION/TRAINING PROGRAM
Payer: COMMERCIAL

## 2024-11-25 DIAGNOSIS — R97.20 ELEVATED PROSTATE SPECIFIC ANTIGEN (PSA): ICD-10-CM

## 2024-11-25 PROCEDURE — 255N000002 HC RX 255 OP 636: Performed by: STUDENT IN AN ORGANIZED HEALTH CARE EDUCATION/TRAINING PROGRAM

## 2024-11-25 PROCEDURE — A9585 GADOBUTROL INJECTION: HCPCS | Performed by: STUDENT IN AN ORGANIZED HEALTH CARE EDUCATION/TRAINING PROGRAM

## 2024-11-25 PROCEDURE — 72197 MRI PELVIS W/O & W/DYE: CPT

## 2024-11-25 RX ORDER — GADOBUTROL 604.72 MG/ML
8.5 INJECTION INTRAVENOUS ONCE
Status: COMPLETED | OUTPATIENT
Start: 2024-11-25 | End: 2024-11-25

## 2024-11-25 RX ADMIN — GADOBUTROL 8.5 ML: 604.72 INJECTION INTRAVENOUS at 20:01

## 2025-03-16 DIAGNOSIS — I48.0 PAROXYSMAL ATRIAL FIBRILLATION (H): ICD-10-CM

## 2025-03-17 RX ORDER — APIXABAN 5 MG/1
5 TABLET, FILM COATED ORAL 2 TIMES DAILY
Qty: 180 TABLET | Refills: 0 | Status: SHIPPED | OUTPATIENT
Start: 2025-03-17

## 2025-03-18 ENCOUNTER — HOSPITAL ENCOUNTER (OUTPATIENT)
Dept: ULTRASOUND IMAGING | Facility: CLINIC | Age: 70
Discharge: HOME | End: 2025-03-18
Attending: STUDENT IN AN ORGANIZED HEALTH CARE EDUCATION/TRAINING PROGRAM | Admitting: STUDENT IN AN ORGANIZED HEALTH CARE EDUCATION/TRAINING PROGRAM
Payer: COMMERCIAL

## 2025-03-18 DIAGNOSIS — N50.9 DISORDER OF MALE GENITAL ORGANS, UNSPECIFIED: ICD-10-CM

## 2025-03-18 PROCEDURE — 93976 VASCULAR STUDY: CPT

## 2025-04-23 ENCOUNTER — OFFICE VISIT (OUTPATIENT)
Dept: CARDIOLOGY | Facility: CLINIC | Age: 70
End: 2025-04-23
Attending: NURSE PRACTITIONER
Payer: COMMERCIAL

## 2025-04-23 VITALS
SYSTOLIC BLOOD PRESSURE: 113 MMHG | DIASTOLIC BLOOD PRESSURE: 78 MMHG | BODY MASS INDEX: 29.76 KG/M2 | RESPIRATION RATE: 18 BRPM | WEIGHT: 190 LBS | HEART RATE: 72 BPM

## 2025-04-23 DIAGNOSIS — I48.0 PAROXYSMAL ATRIAL FIBRILLATION (H): Primary | ICD-10-CM

## 2025-04-23 DIAGNOSIS — Z98.890 STATUS POST CATHETER ABLATION OF ATRIAL FIBRILLATION: ICD-10-CM

## 2025-04-23 DIAGNOSIS — R93.1 ELEVATED CORONARY ARTERY CALCIUM SCORE: ICD-10-CM

## 2025-04-23 RX ORDER — TAMSULOSIN HYDROCHLORIDE 0.4 MG/1
2 CAPSULE ORAL DAILY
COMMUNITY
Start: 2025-03-17

## 2025-04-23 NOTE — PROGRESS NOTES
[facility]  Cardiac Electrophysiology     Progress Note: Royer Springer MD    Primary Care: Jamshid Zhu MD    Primary Cardiologist:     Primary Electrophysiologist: Royer Springer MD    Assessment:       Paroxysmal atrial fibrillation: Chronic problem with the patient who underwent successful ablation 1/23/2019 (PVI + CTI line).  He currently reports no symptomatic palpitations or other indications of recurrence of his arrhythmia.  The patient has been maintained off intravenous drug therapy and AV garrison blocking medications.  He remains on DOAC therapy with an elevated [id number] score of 2.  He reports no bleeding complications on his current medical regimen.  Arrhythmia hx  Dx/date: 2018  [id number] score = 3 (age, HTN, CAD)  Sx: Palpitations  Prior AAD, AV garrison blocking agents: Beta-blocker  Procedures  DCCV: N/A  Ablation: 1/23/2019 (PVI + CTI line)    ASCVD, native vessel: Patient's CT imaging study from 2024 demonstrated coronary calcium despite relatively low LDL at 83.  I have suggested he follow-up with general cardiology to discuss additional strategies to lower his risk for progression of his disease.  MAKAYLA: Diagnosed in 2005 and consistently uses CPAP  Essential hypertension: Chronic problem for the patient and his blood pressure today is at target on his current medication regimen      Recommendations:  Zio patch monitor x 7 days  Referral to general cardiology for management of ASCVD in 4 months  Follow-up in arrhythmia clinic with the EP [facility] in 12 months    Time spent: 45 minutes spent on the date of the encounter doing chart review, history and exam, documentation and further activities as noted above.    Subjective:  Amilcar Penaloza (69 year old male) returns to the arrhythmia clinic for interval reevaluation of his atrial fibrillation status post ablation.  The patient reports no symptoms suggesting tachypalpitations or other awareness of his rhythm.  The patient  noted some fatigue and needed to stop and rest yesterday while working outdoors for prolonged period.  It is clear that the patient could certainly do better in regards to his oral hydration status and we discussed that target.  The patient reports no exertional chest pain or pressure.  He is not having any orthopnea, PND or ankle edema.    Current Outpatient Medications   Medication Sig Dispense Refill    apixaban ANTICOAGULANT (ELIQUIS ANTICOAGULANT) 5 MG tablet TAKE 1 TABLET BY MOUTH TWICE A  tablet 0    ascorbic acid, vitamin C, (ASCORBIC ACID WITH BERNABE HIPS) 500 MG tablet [ASCORBIC ACID, VITAMIN C, (ASCORBIC ACID WITH BERNABE HIPS) 500 MG TABLET] Take 500 mg by mouth daily.      calcium carbonate (OS-MAYLIN) 600 mg calcium (1,500 mg) tablet [CALCIUM CARBONATE (OS-MAYLIN) 600 MG CALCIUM (1,500 MG) TABLET] Take 600 mg by mouth daily.      cholecalciferol, vitamin D3, 1,000 unit tablet [CHOLECALCIFEROL, VITAMIN D3, 1,000 UNIT TABLET] Take 1,000 Units by mouth daily.      losartan (COZAAR) 25 MG tablet TAKE 1 TABLET BY MOUTH EVERY DAY 90 tablet 3    mercaptopurine (PURINETHOL) 50 mg tablet [MERCAPTOPURINE (PURINETHOL) 50 MG TABLET] Take 50 mg by mouth daily.      multivit-min/FA/lycopen/lutein (CENTRUM SILVER MEN ORAL) [MULTIVIT-MIN/FA/LYCOPEN/LUTEIN (CENTRUM SILVER MEN ORAL)] 1 tab(s)         Review of Systems:     Family History  Family History   Problem Relation Age of Onset    Pacemaker Mother     CABG Mother 90    No Known Problems Father     No Known Problems Sister     No Known Problems Sister     Sudden Death Sister 58    Other - See Comments Sister 16        Motorcycle accident    No Known Problems Sister     Atrial fibrillation Brother         Ablation    No Known Problems Brother     No Known Problems Brother     No Known Problems Brother        Social History   reports that he has never smoked. He has never used smokeless tobacco.    Objective:   Vital signs in last 24 hours:  /78 (BP Location:  Right arm, Patient Position: Sitting, Cuff Size: Adult Large)   Pulse 72   Resp 18   Wt 86.2 kg (190 lb)   BMI 29.76 kg/m      Physical Exam:  General: The patient is alert oriented to person place and situation.  The patient is in no acute distress at the time of my evaluation.  Eyes: Pupils are equal, round, and reactive to light.  Conjunctiva and sclera are clear.  ENT: Oral mucosa is moist and without redness. No evident nasal discharge.  Pulmonary: Lungs are clear bilaterally with no rales, rhonchi, or wheezes.    Cardiovascular exam: Rhythm is regular. S1 and S2 are normal. No significant murmur is present. JVP is normal. Lower extremities demonstrate no significant edema. Distal pulses are intact bilaterally.  Abdomen is soft, nontender, no organomegaly, and bowel sounds present.  Musculoskeletal: Spine is straight. Extremities without deformity.  Neuro: Gait is normal.   Skin is warm, dry, and otherwise intact.      Cardiographics:       Lab Results:         Outside record review:

## 2025-04-23 NOTE — LETTER
4/23/2025    Jamshid Zhu MD  [address]  [place] [address]    RE: Amilcar Penaloza       Dear Colleague,     I had the pleasure of seeing Amilcar Penaloza in the [facility].    [facility]  Cardiac Electrophysiology     Progress Note: Royer Springer MD    Primary Care: Jamshid Zhu MD    Primary Cardiologist:     Primary Electrophysiologist: Royer Springer MD    Assessment:       Paroxysmal atrial fibrillation: Chronic problem with the patient who underwent successful ablation 1/23/2019 (PVI + CTI line).  He currently reports no symptomatic palpitations or other indications of recurrence of his arrhythmia.  The patient has been maintained off intravenous drug therapy and AV garrison blocking medications.  He remains on DOAC therapy with an elevated [id number] score of 2.  He reports no bleeding complications on his current medical regimen.  Arrhythmia hx  Dx/date: 2018  [id number] score = 3 (age, HTN, CAD)  Sx: Palpitations  Prior AAD, AV garrison blocking agents: Beta-blocker  Procedures  DCCV: N/A  Ablation: 1/23/2019 (PVI + CTI line)    ASCVD, native vessel: Patient's CT imaging study from 2024 demonstrated coronary calcium despite relatively low LDL at 83.  I have suggested he follow-up with general cardiology to discuss additional strategies to lower his risk for progression of his disease.  MAKAYLA: Diagnosed in 2005 and consistently uses CPAP  Essential hypertension: Chronic problem for the patient and his blood pressure today is at target on his current medication regimen      Recommendations:  Zio patch monitor x 7 days  Referral to general cardiology for management of ASCVD in 4 months  Follow-up in arrhythmia clinic with the EP [facility] in 12 months    Time spent: 45 minutes spent on the date of the encounter doing chart review, history and exam, documentation and further activities as noted above.    Subjective:  Amilcar Penaloza (69 year old  male) returns to the arrhythmia clinic for interval reevaluation of his atrial fibrillation status post ablation.  The patient reports no symptoms suggesting tachypalpitations or other awareness of his rhythm.  The patient noted some fatigue and needed to stop and rest yesterday while working outdoors for prolonged period.  It is clear that the patient could certainly do better in regards to his oral hydration status and we discussed that target.  The patient reports no exertional chest pain or pressure.  He is not having any orthopnea, PND or ankle edema.    Current Outpatient Medications   Medication Sig Dispense Refill     apixaban ANTICOAGULANT (ELIQUIS ANTICOAGULANT) 5 MG tablet TAKE 1 TABLET BY MOUTH TWICE A  tablet 0     ascorbic acid, vitamin C, (ASCORBIC ACID WITH BERNABE HIPS) 500 MG tablet [ASCORBIC ACID, VITAMIN C, (ASCORBIC ACID WITH BERNABE HIPS) 500 MG TABLET] Take 500 mg by mouth daily.       calcium carbonate (OS-MAYLIN) 600 mg calcium (1,500 mg) tablet [CALCIUM CARBONATE (OS-MAYLIN) 600 MG CALCIUM (1,500 MG) TABLET] Take 600 mg by mouth daily.       cholecalciferol, vitamin D3, 1,000 unit tablet [CHOLECALCIFEROL, VITAMIN D3, 1,000 UNIT TABLET] Take 1,000 Units by mouth daily.       losartan (COZAAR) 25 MG tablet TAKE 1 TABLET BY MOUTH EVERY DAY 90 tablet 3     mercaptopurine (PURINETHOL) 50 mg tablet [MERCAPTOPURINE (PURINETHOL) 50 MG TABLET] Take 50 mg by mouth daily.       multivit-min/FA/lycopen/lutein (CENTRUM SILVER MEN ORAL) [MULTIVIT-MIN/FA/LYCOPEN/LUTEIN (CENTRUM SILVER MEN ORAL)] 1 tab(s)         Review of Systems:     Family History  Family History   Problem Relation Age of Onset     Pacemaker Mother      CABG Mother 90     No Known Problems Father      No Known Problems Sister      No Known Problems Sister      Sudden Death Sister 58     Other - See Comments Sister 16        Motorcycle accident     No Known Problems Sister      Atrial fibrillation Brother         Ablation     No Known  Problems Brother      No Known Problems Brother      No Known Problems Brother        Social History   reports that he has never smoked. He has never used smokeless tobacco.    Objective:   Vital signs in last 24 hours:  /78 (BP Location: Right arm, Patient Position: Sitting, Cuff Size: Adult Large)   Pulse 72   Resp 18   Wt 86.2 kg (190 lb)   BMI 29.76 kg/m      Physical Exam:  General: The patient is alert oriented to person place and situation.  The patient is in no acute distress at the time of my evaluation.  Eyes: Pupils are equal, round, and reactive to light.  Conjunctiva and sclera are clear.  ENT: Oral mucosa is moist and without redness. No evident nasal discharge.  Pulmonary: Lungs are clear bilaterally with no rales, rhonchi, or wheezes.    Cardiovascular exam: Rhythm is regular. S1 and S2 are normal. No significant murmur is present. JVP is normal. Lower extremities demonstrate no significant edema. Distal pulses are intact bilaterally.  Abdomen is soft, nontender, no organomegaly, and bowel sounds present.  Musculoskeletal: Spine is straight. Extremities without deformity.  Neuro: Gait is normal.   Skin is warm, dry, and otherwise intact.      Cardiographics:       Lab Results:         Outside record review:          Thank you for allowing me to participate in the care of your patient.      Sincerely,     Royer Springer MD     [facility]  cc:   Jeanette Mejias, NP  [address], [address] [place] [id number]

## 2025-05-06 ENCOUNTER — TELEPHONE (OUTPATIENT)
Dept: CARDIOLOGY | Facility: CLINIC | Age: 70
End: 2025-05-06
Payer: COMMERCIAL

## 2025-05-06 NOTE — TELEPHONE ENCOUNTER
Phone call to patient, to patient. Informed him to anticipate Zio arrival in a few days. Appears to have shipped yesterday. No further questions. MADELEINE

## 2025-05-06 NOTE — TELEPHONE ENCOUNTER
[facility] Call Center    Phone Message    May a detailed message be left on voicemail: yes     Reason for Call: Other: Patient is still waiting for zio patch to arrive.   Please verify if this order was sent to [facility]. If not please get order sent and monitor mailed     Action Taken: Other: cardio    Travel Screening: Not Applicable     Date of Service:

## 2025-05-07 ENCOUNTER — ORDERS ONLY (AUTO-RELEASED) (OUTPATIENT)
Dept: CARDIOLOGY | Facility: CLINIC | Age: 70
End: 2025-05-07
Payer: COMMERCIAL

## 2025-05-07 DIAGNOSIS — I48.0 PAROXYSMAL ATRIAL FIBRILLATION (H): ICD-10-CM

## 2025-05-07 DIAGNOSIS — Z98.890 STATUS POST CATHETER ABLATION OF ATRIAL FIBRILLATION: ICD-10-CM

## 2025-05-29 LAB — CV ZIO PRELIM RESULTS: NORMAL

## 2025-06-17 ENCOUNTER — RESULTS FOLLOW-UP (OUTPATIENT)
Dept: CARDIOLOGY | Facility: CLINIC | Age: 70
End: 2025-06-17

## 2025-08-19 ENCOUNTER — OFFICE VISIT (OUTPATIENT)
Dept: CARDIOLOGY | Facility: CLINIC | Age: 70
End: 2025-08-19
Payer: COMMERCIAL

## 2025-08-19 VITALS
SYSTOLIC BLOOD PRESSURE: 122 MMHG | WEIGHT: 192 LBS | HEART RATE: 74 BPM | HEIGHT: 67 IN | BODY MASS INDEX: 30.13 KG/M2 | DIASTOLIC BLOOD PRESSURE: 66 MMHG | RESPIRATION RATE: 17 BRPM

## 2025-08-19 DIAGNOSIS — Z98.890 STATUS POST CATHETER ABLATION OF ATRIAL FIBRILLATION: ICD-10-CM

## 2025-08-19 DIAGNOSIS — R93.1 ELEVATED CORONARY ARTERY CALCIUM SCORE: ICD-10-CM

## 2025-08-19 DIAGNOSIS — I48.0 PAROXYSMAL ATRIAL FIBRILLATION (H): ICD-10-CM

## 2025-08-19 PROCEDURE — 3074F SYST BP LT 130 MM HG: CPT | Performed by: INTERNAL MEDICINE

## 2025-08-19 PROCEDURE — G2211 COMPLEX E/M VISIT ADD ON: HCPCS | Performed by: INTERNAL MEDICINE

## 2025-08-19 PROCEDURE — 99214 OFFICE O/P EST MOD 30 MIN: CPT | Performed by: INTERNAL MEDICINE

## 2025-08-19 PROCEDURE — 3078F DIAST BP <80 MM HG: CPT | Performed by: INTERNAL MEDICINE

## 2025-08-19 RX ORDER — ALFUZOSIN HYDROCHLORIDE 10 MG/1
1 TABLET, EXTENDED RELEASE ORAL DAILY
COMMUNITY
Start: 2025-08-01

## 2025-08-19 RX ORDER — ROSUVASTATIN CALCIUM 5 MG/1
5 TABLET, COATED ORAL DAILY
Qty: 90 TABLET | Refills: 3 | Status: SHIPPED | OUTPATIENT
Start: 2025-08-19